# Patient Record
Sex: FEMALE | Race: WHITE | NOT HISPANIC OR LATINO | Employment: FULL TIME | URBAN - METROPOLITAN AREA
[De-identification: names, ages, dates, MRNs, and addresses within clinical notes are randomized per-mention and may not be internally consistent; named-entity substitution may affect disease eponyms.]

---

## 2017-08-10 ENCOUNTER — TRANSFERRED RECORDS (OUTPATIENT)
Dept: HEALTH INFORMATION MANAGEMENT | Facility: CLINIC | Age: 32
End: 2017-08-10

## 2017-08-10 LAB
HPV ABSTRACT: NORMAL
PAP SMEAR - HIM PATIENT REPORTED: NEGATIVE
PAP-ABSTRACT: NORMAL

## 2018-09-26 ENCOUNTER — TRANSFERRED RECORDS (OUTPATIENT)
Dept: HEALTH INFORMATION MANAGEMENT | Facility: CLINIC | Age: 33
End: 2018-09-26

## 2018-10-15 ENCOUNTER — TRANSFERRED RECORDS (OUTPATIENT)
Dept: HEALTH INFORMATION MANAGEMENT | Facility: CLINIC | Age: 33
End: 2018-10-15

## 2019-12-20 ENCOUNTER — TRANSFERRED RECORDS (OUTPATIENT)
Dept: HEALTH INFORMATION MANAGEMENT | Facility: CLINIC | Age: 34
End: 2019-12-20

## 2021-04-02 ENCOUNTER — TRANSFERRED RECORDS (OUTPATIENT)
Dept: HEALTH INFORMATION MANAGEMENT | Facility: CLINIC | Age: 36
End: 2021-04-02
Payer: COMMERCIAL

## 2021-11-23 ENCOUNTER — VIRTUAL VISIT (OUTPATIENT)
Dept: FAMILY MEDICINE | Facility: CLINIC | Age: 36
End: 2021-11-23
Payer: COMMERCIAL

## 2021-11-23 DIAGNOSIS — F90.9 ATTENTION DEFICIT HYPERACTIVITY DISORDER (ADHD), UNSPECIFIED ADHD TYPE: Primary | ICD-10-CM

## 2021-11-23 PROCEDURE — 99203 OFFICE O/P NEW LOW 30 MIN: CPT | Mod: 95 | Performed by: PHYSICIAN ASSISTANT

## 2021-11-23 RX ORDER — LEVONORGESTREL AND ETHINYL ESTRADIOL 6-5-10
1 KIT ORAL DAILY
COMMUNITY
End: 2022-08-03

## 2021-11-23 RX ORDER — DEXTROAMPHETAMINE SACCHARATE, AMPHETAMINE ASPARTATE MONOHYDRATE, DEXTROAMPHETAMINE SULFATE AND AMPHETAMINE SULFATE 2.5; 2.5; 2.5; 2.5 MG/1; MG/1; MG/1; MG/1
10 CAPSULE, EXTENDED RELEASE ORAL DAILY
Status: CANCELLED | OUTPATIENT
Start: 2021-11-23

## 2021-11-23 RX ORDER — DEXTROAMPHETAMINE SACCHARATE, AMPHETAMINE ASPARTATE MONOHYDRATE, DEXTROAMPHETAMINE SULFATE AND AMPHETAMINE SULFATE 3.75; 3.75; 3.75; 3.75 MG/1; MG/1; MG/1; MG/1
15 CAPSULE, EXTENDED RELEASE ORAL DAILY
Qty: 30 CAPSULE | Refills: 0 | Status: SHIPPED | OUTPATIENT
Start: 2021-11-23 | End: 2021-12-16

## 2021-11-23 RX ORDER — ALPRAZOLAM 0.5 MG
0.5 TABLET ORAL DAILY PRN
COMMUNITY
End: 2024-07-03

## 2021-11-23 RX ORDER — CYCLOBENZAPRINE HCL 10 MG
10 TABLET ORAL
COMMUNITY
Start: 2021-07-22 | End: 2021-11-23

## 2021-11-23 RX ORDER — DEXTROAMPHETAMINE SACCHARATE, AMPHETAMINE ASPARTATE MONOHYDRATE, DEXTROAMPHETAMINE SULFATE AND AMPHETAMINE SULFATE 2.5; 2.5; 2.5; 2.5 MG/1; MG/1; MG/1; MG/1
10 CAPSULE, EXTENDED RELEASE ORAL DAILY
COMMUNITY
Start: 2021-04-20 | End: 2021-11-23

## 2021-11-23 ASSESSMENT — ANXIETY QUESTIONNAIRES
7. FEELING AFRAID AS IF SOMETHING AWFUL MIGHT HAPPEN: SEVERAL DAYS
5. BEING SO RESTLESS THAT IT IS HARD TO SIT STILL: SEVERAL DAYS
2. NOT BEING ABLE TO STOP OR CONTROL WORRYING: SEVERAL DAYS
GAD7 TOTAL SCORE: 7
1. FEELING NERVOUS, ANXIOUS, OR ON EDGE: SEVERAL DAYS
IF YOU CHECKED OFF ANY PROBLEMS ON THIS QUESTIONNAIRE, HOW DIFFICULT HAVE THESE PROBLEMS MADE IT FOR YOU TO DO YOUR WORK, TAKE CARE OF THINGS AT HOME, OR GET ALONG WITH OTHER PEOPLE: SOMEWHAT DIFFICULT
6. BECOMING EASILY ANNOYED OR IRRITABLE: SEVERAL DAYS
3. WORRYING TOO MUCH ABOUT DIFFERENT THINGS: SEVERAL DAYS

## 2021-11-23 ASSESSMENT — PATIENT HEALTH QUESTIONNAIRE - PHQ9: 5. POOR APPETITE OR OVEREATING: SEVERAL DAYS

## 2021-11-23 NOTE — PROGRESS NOTES
Christian is a 36 year old who is being evaluated via a billable video visit.      How would you like to obtain your AVS? MyChart  If the video visit is dropped, the invitation should be resent by: Text to cell phone: [phone]  Will anyone else be joining your video visit? No      Video Start Time: 5:25 PM        Subjective   Christian is a 36 year old who presents for the following health issues : new pt getting established    HPI       Chief Complaint   Patient presents with     [facility]     Recheck Medication     Anxiety     Followed by gyn who used to prescribe her adderall but told her needs to have PCP do that now.  She started adderall [id number]  She did testing through the [facility]  She is in school and able to focus better with the adderall  She is followed by gyn for paps    She feels her anxiety currently stable.    She has some scalp painful bumps that she wants checked when she comes in.    She has enough BCP until she comes in for px next month        Objective           Vitals:  No vitals were obtained today due to virtual visit.    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 Has appt to see me for px next month. Will be vaccination record as not sure when last TDAP done. She wants covid booster at that time. Also due for flu shot so recd she get that a her local pharmacy this week  Plan: amphetamine-dextroamphetamine (ADDERALL XR) 15         MG 24 hr capsule        #30, no ref      Prisca Troncoso PA-C            Video-Visit Details    Type of service:  Video Visit    Video End Time:5:46 PM    Originating Location (pt. Location): Home    Distant Location (provider location):  [facility]      Platform used for Video Visit: [facility]

## 2021-11-24 ASSESSMENT — ANXIETY QUESTIONNAIRES: GAD7 TOTAL SCORE: 7

## 2021-12-16 ENCOUNTER — OFFICE VISIT (OUTPATIENT)
Dept: FAMILY MEDICINE | Facility: CLINIC | Age: 36
End: 2021-12-16
Payer: COMMERCIAL

## 2021-12-16 VITALS
HEART RATE: 78 BPM | WEIGHT: 189 LBS | HEIGHT: 66 IN | DIASTOLIC BLOOD PRESSURE: 78 MMHG | OXYGEN SATURATION: 96 % | TEMPERATURE: 97.6 F | RESPIRATION RATE: 18 BRPM | SYSTOLIC BLOOD PRESSURE: 117 MMHG | BODY MASS INDEX: 30.37 KG/M2

## 2021-12-16 DIAGNOSIS — F90.9 ATTENTION DEFICIT HYPERACTIVITY DISORDER (ADHD), UNSPECIFIED ADHD TYPE: Primary | ICD-10-CM

## 2021-12-16 DIAGNOSIS — L21.9 SEBORRHEIC DERMATITIS: ICD-10-CM

## 2021-12-16 DIAGNOSIS — Z30.09 BIRTH CONTROL COUNSELING: ICD-10-CM

## 2021-12-16 DIAGNOSIS — Z23 HIGH PRIORITY FOR 2019 NOVEL CORONAVIRUS VACCINATION: ICD-10-CM

## 2021-12-16 PROCEDURE — 91306 COVID-19,PF,MODERNA (18+ YRS BOOSTER .25ML): CPT | Performed by: PHYSICIAN ASSISTANT

## 2021-12-16 PROCEDURE — 99213 OFFICE O/P EST LOW 20 MIN: CPT | Mod: 25 | Performed by: PHYSICIAN ASSISTANT

## 2021-12-16 PROCEDURE — 0064A COVID-19,PF,MODERNA (18+ YRS BOOSTER .25ML): CPT | Performed by: PHYSICIAN ASSISTANT

## 2021-12-16 RX ORDER — KETOCONAZOLE 20 MG/ML
SHAMPOO TOPICAL DAILY PRN
Qty: 120 ML | Refills: 1 | Status: SHIPPED | OUTPATIENT
Start: 2021-12-16 | End: 2022-08-03

## 2021-12-16 RX ORDER — DEXTROAMPHETAMINE SACCHARATE, AMPHETAMINE ASPARTATE MONOHYDRATE, DEXTROAMPHETAMINE SULFATE AND AMPHETAMINE SULFATE 3.75; 3.75; 3.75; 3.75 MG/1; MG/1; MG/1; MG/1
15 CAPSULE, EXTENDED RELEASE ORAL DAILY
Qty: 30 CAPSULE | Refills: 0 | Status: SHIPPED | OUTPATIENT
Start: 2021-12-16 | End: 2022-08-03

## 2021-12-16 ASSESSMENT — MIFFLIN-ST. JEOR: SCORE: 1568.02

## 2021-12-16 NOTE — PROGRESS NOTES
"HPI: Christian is a 35 yo female here to get established  She occas gets bumps on her scalp when she is stress  She occas has itching behind her ears    ADHD: takes adderall for years.    Pt thinking of going off of birth control in the spring to try to get pregnant  Her pap is up to date.   Will request gyn records.  She'd like ref to new gyn in [place]    She has xanax on the list for fear of flying so doesn't take often    Past Medical History:   Diagnosis Date     Anxiety      No past surgical history on file.  Social History     Tobacco Use     Smoking status: Never Smoker     Smokeless tobacco: Never Used   Substance Use Topics     Alcohol use: Yes     Comment: 0-4 per week     Current Outpatient Medications   Medication Sig Dispense Refill     ALPRAZolam (XANAX) 0.5 MG tablet Take 0.5 mg by mouth daily as needed for anxiety       amphetamine-dextroamphetamine (ADDERALL XR) 15 MG 24 hr capsule Take 1 capsule (15 mg) by mouth daily 30 capsule 0     ketoconazole (NIZORAL) 2 % external shampoo Apply topically daily as needed for itching or irritation 120 mL 1     levonorg-eth estrad triphasic (TRIVORA, 28,) 50-30/75-40/ 125-30 MCG TABS Take 1 tablet by mouth daily       Allergies   Allergen Reactions     Seasonal Allergies        PHYSICAL EXAM:    /78 (BP Location: Right arm, Patient Position: Chair, Cuff Size: Adult Large)   Pulse 78   Temp 97.6  F (36.4  C) (Temporal)   Resp 18   Ht 1.683 m (5' 6.25\")   Wt 85.7 kg (189 lb)   LMP 12/14/2021   SpO2 96%   BMI 30.28 kg/m      Patient appears non toxic  Psych: approp affect and mood  Scalp: no flaking or redness. Few excoriated bumps    Assessment and Plan:     (F90.9) Attention deficit hyperactivity disorder (ADHD), unspecified ADHD type  (primary encounter diagnosis)  Comment:   Plan: amphetamine-dextroamphetamine (ADDERALL XR) 15         MG 24 hr capsule        refilled    (Z30.09) Birth control counseling  Comment: [facility]  Plan: " Ob/Gyn Referral        She will attempt pregnancy this spring. Will request previous gyn records for the chart. She believes her pap is up to date. Will check those records for last Tdap.      (L21.9) Seborrheic dermatitis  Comment:   Plan: ketoconazole (NIZORAL) 2 % external shampoo          *Covid booster given today. Recd flu shot next week    Prisca Troncoso PA-C

## 2022-01-18 ENCOUNTER — MYC REFILL (OUTPATIENT)
Dept: FAMILY MEDICINE | Facility: CLINIC | Age: 37
End: 2022-01-18
Payer: COMMERCIAL

## 2022-01-18 DIAGNOSIS — F90.9 ATTENTION DEFICIT HYPERACTIVITY DISORDER (ADHD), UNSPECIFIED ADHD TYPE: ICD-10-CM

## 2022-01-18 RX ORDER — DEXTROAMPHETAMINE SACCHARATE, AMPHETAMINE ASPARTATE MONOHYDRATE, DEXTROAMPHETAMINE SULFATE AND AMPHETAMINE SULFATE 3.75; 3.75; 3.75; 3.75 MG/1; MG/1; MG/1; MG/1
15 CAPSULE, EXTENDED RELEASE ORAL DAILY
Qty: 30 CAPSULE | Refills: 0 | Status: CANCELLED | OUTPATIENT
Start: 2022-01-18

## 2022-01-19 RX ORDER — DEXTROAMPHETAMINE SACCHARATE, AMPHETAMINE ASPARTATE MONOHYDRATE, DEXTROAMPHETAMINE SULFATE AND AMPHETAMINE SULFATE 3.75; 3.75; 3.75; 3.75 MG/1; MG/1; MG/1; MG/1
15 CAPSULE, EXTENDED RELEASE ORAL DAILY
Qty: 30 CAPSULE | Refills: 0 | Status: SHIPPED | OUTPATIENT
Start: 2022-01-19 | End: 2022-03-23

## 2022-01-19 NOTE — TELEPHONE ENCOUNTER
Routing refill request to provider for review/approval because:  Drug not on the [facility] refill protocol .     April Torrez RN  [facility] Triage

## 2022-02-06 ENCOUNTER — HEALTH MAINTENANCE LETTER (OUTPATIENT)
Age: 37
End: 2022-02-06

## 2022-03-23 ENCOUNTER — MYC REFILL (OUTPATIENT)
Dept: FAMILY MEDICINE | Facility: CLINIC | Age: 37
End: 2022-03-23
Payer: COMMERCIAL

## 2022-03-23 DIAGNOSIS — F90.9 ATTENTION DEFICIT HYPERACTIVITY DISORDER (ADHD), UNSPECIFIED ADHD TYPE: ICD-10-CM

## 2022-03-28 RX ORDER — DEXTROAMPHETAMINE SACCHARATE, AMPHETAMINE ASPARTATE MONOHYDRATE, DEXTROAMPHETAMINE SULFATE AND AMPHETAMINE SULFATE 3.75; 3.75; 3.75; 3.75 MG/1; MG/1; MG/1; MG/1
15 CAPSULE, EXTENDED RELEASE ORAL DAILY
Qty: 30 CAPSULE | Refills: 0 | Status: SHIPPED | OUTPATIENT
Start: 2022-03-28 | End: 2022-04-29

## 2022-03-28 NOTE — TELEPHONE ENCOUNTER
Routing refill request to provider for review/approval because:  Drug not on the [facility] refill protocol   Salena Johns RN

## 2022-04-29 ENCOUNTER — MYC REFILL (OUTPATIENT)
Dept: FAMILY MEDICINE | Facility: CLINIC | Age: 37
End: 2022-04-29
Payer: COMMERCIAL

## 2022-04-29 DIAGNOSIS — F90.9 ATTENTION DEFICIT HYPERACTIVITY DISORDER (ADHD), UNSPECIFIED ADHD TYPE: ICD-10-CM

## 2022-05-02 RX ORDER — DEXTROAMPHETAMINE SACCHARATE, AMPHETAMINE ASPARTATE MONOHYDRATE, DEXTROAMPHETAMINE SULFATE AND AMPHETAMINE SULFATE 3.75; 3.75; 3.75; 3.75 MG/1; MG/1; MG/1; MG/1
15 CAPSULE, EXTENDED RELEASE ORAL DAILY
Qty: 30 CAPSULE | Refills: 0 | Status: SHIPPED | OUTPATIENT
Start: 2022-05-02 | End: 2022-06-20

## 2022-05-02 NOTE — TELEPHONE ENCOUNTER
Last visit 12/16/21    Routing refill request to provider for review/approval because:  Drug not on the [facility] refill protocol     Norberto Covarrubias RN  [facility]       amphetamine-dextroamphetamine (ADDERALL XR) 15 MG 24 hr capsule 30 capsule 0 3/28/2022  No   Sig - Route: Take 1 capsule (15 mg) by mouth daily - Oral   Sent to pharmacy as: Amphetamine-Dextroamphet ER 15 MG Oral Capsule Extended Release 24 Hour (ADDERALL XR)   Class: E-Prescribe   Earliest Fill Date: 3/28/2022   Order: [id number]   E-Prescribing Status: Receipt confirmed by pharmacy (3/28/2022 12:58 PM CDT)

## 2022-06-20 ENCOUNTER — MYC REFILL (OUTPATIENT)
Dept: FAMILY MEDICINE | Facility: CLINIC | Age: 37
End: 2022-06-20
Payer: COMMERCIAL

## 2022-06-20 DIAGNOSIS — F90.9 ATTENTION DEFICIT HYPERACTIVITY DISORDER (ADHD), UNSPECIFIED ADHD TYPE: ICD-10-CM

## 2022-06-21 RX ORDER — DEXTROAMPHETAMINE SACCHARATE, AMPHETAMINE ASPARTATE MONOHYDRATE, DEXTROAMPHETAMINE SULFATE AND AMPHETAMINE SULFATE 3.75; 3.75; 3.75; 3.75 MG/1; MG/1; MG/1; MG/1
15 CAPSULE, EXTENDED RELEASE ORAL DAILY
Qty: 30 CAPSULE | Refills: 0 | Status: SHIPPED | OUTPATIENT
Start: 2022-06-21 | End: 2024-08-02

## 2022-07-24 ENCOUNTER — HEALTH MAINTENANCE LETTER (OUTPATIENT)
Age: 37
End: 2022-07-24

## 2022-08-03 ENCOUNTER — PRENATAL OFFICE VISIT (OUTPATIENT)
Dept: NURSING | Facility: CLINIC | Age: 37
End: 2022-08-03

## 2022-08-03 ENCOUNTER — TRANSCRIBE ORDERS (OUTPATIENT)
Dept: MATERNAL FETAL MEDICINE | Facility: CLINIC | Age: 37
End: 2022-08-03

## 2022-08-03 VITALS — WEIGHT: 170 LBS | BODY MASS INDEX: 26.68 KG/M2 | HEIGHT: 67 IN

## 2022-08-03 DIAGNOSIS — O09.519 ENCOUNTER FOR SUPERVISION OF PRIMIGRAVIDA OF ADVANCED MATERNAL AGE: Primary | ICD-10-CM

## 2022-08-03 DIAGNOSIS — O26.90 PREGNANCY RELATED CONDITION, ANTEPARTUM: Primary | ICD-10-CM

## 2022-08-03 DIAGNOSIS — Z23 NEED FOR TDAP VACCINATION: ICD-10-CM

## 2022-08-03 PROCEDURE — 99207 PR NO CHARGE NURSE ONLY: CPT

## 2022-08-03 RX ORDER — LEVONORGESTREL AND ETHINYL ESTRADIOL 6-5-10
KIT ORAL
COMMUNITY
Start: 2021-07-04 | End: 2022-08-03

## 2022-08-03 NOTE — PROGRESS NOTES
Important Information for Provider:     New ob nurse intake by phone, first pregnancy,AMA. Handouts reviewed and mailed. Recommended B6, Unisom for nausea. Ordered genetic screening. Ultrasound scheduled for 8/17/2022 with Dr Farris to call with results. NOB with Dr Pop 8/25/2022. Patient states her periods occurred every 40 days.   Patient stopped taking her Adderall 1 week ago    Caffeine intake/servings daily - 0  Calcium intake/servings daily - 3  Exercise 5 times weekly - describe ; Jason, , precautions given  Sunscreen used - Yes  Seatbelts used - Yes  Guns stored in the home - No  Self Breast Exam - Yes  Pap test up to date -  Yes  Eye exam up to date -  Yes  Dental exam up to date -  Yes  Immunizations reviewed and up to date - Yes  Abuse: Current or Past (Physical, Sexual or Emotional) - No  Do you feel safe in your environment - Yes  Do you cope well with stress - Yes  Do you suffer from insomnia - No        Prenatal OB Questionnaire  Patient supplied answers from flow sheet for:  Prenatal OB Questionnaire.  Past Medical History  Have you ever recieved care for your mental health? : (!) Yes  Have you ever been in a major accident or suffered serious trauma?: No  Within the last year, has anyone hit, slapped, kicked or otherwise hurt you?: No  In the last year, has anyone forced you to have sex when you didn't want to?: No    Past Medical History 2   Have you ever received a blood transfusion?: No  Would you accept a blood transfusion if was medically recommended?: Yes  Does anyone in your home smoke?: No   Is your blood type Rh negative?: Unknown  Have you ever ?: No  Have you been hospitalized for a nonsurgical reason excluding normal delivery?: No  Have you ever had an abnormal pap smear?: No    Past Medical History (Continued)  Do you have a history of abnormalities of the uterus?: No  Did your mother take ALVA or any other hormones when she was pregnant with you?: No  Do  you have any other problems we have not asked about which you feel may be important to this pregnancy?: No                     Allergies as of 8/3/2022:    Allergies as of 08/03/2022 - Reviewed 08/03/2022   Allergen Reaction Noted     Seasonal allergies  11/23/2021       Current medications are:  Current Outpatient Medications   Medication Sig Dispense Refill     ALPRAZolam (XANAX) 0.5 MG tablet Take 0.5 mg by mouth daily as needed for anxiety (Patient not taking: Reported on 8/3/2022)       amphetamine-dextroamphetamine (ADDERALL XR) 15 MG 24 hr capsule Take 1 capsule (15 mg) by mouth daily (Patient not taking: No sig reported) 30 capsule 0         Early ultrasound screening tool:    Does patient have irregular periods?  No  Did patient use hormonal birth control in the three months prior to positive urine pregnancy test? No  Is the patient breastfeeding?  No  Is the patient 10 weeks or greater at time of education visit?  No

## 2022-08-16 ENCOUNTER — MYC MEDICAL ADVICE (OUTPATIENT)
Dept: OBGYN | Facility: CLINIC | Age: 37
End: 2022-08-16

## 2022-08-17 ENCOUNTER — ANCILLARY PROCEDURE (OUTPATIENT)
Dept: ULTRASOUND IMAGING | Facility: CLINIC | Age: 37
End: 2022-08-17
Attending: OBSTETRICS & GYNECOLOGY
Payer: COMMERCIAL

## 2022-08-17 DIAGNOSIS — O09.519 ENCOUNTER FOR SUPERVISION OF PRIMIGRAVIDA OF ADVANCED MATERNAL AGE: ICD-10-CM

## 2022-08-17 PROCEDURE — 76801 OB US < 14 WKS SINGLE FETUS: CPT | Performed by: OBSTETRICS & GYNECOLOGY

## 2022-08-22 ENCOUNTER — PRE VISIT (OUTPATIENT)
Dept: MATERNAL FETAL MEDICINE | Facility: CLINIC | Age: 37
End: 2022-08-22

## 2022-08-24 LAB
ABO/RH(D): ABNORMAL
ANTIBODY SCREEN: POSITIVE
SPECIMEN EXPIRATION DATE: ABNORMAL

## 2022-08-25 ENCOUNTER — PRENATAL OFFICE VISIT (OUTPATIENT)
Dept: OBGYN | Facility: CLINIC | Age: 37
End: 2022-08-25
Payer: COMMERCIAL

## 2022-08-25 VITALS
HEIGHT: 67 IN | OXYGEN SATURATION: 98 % | WEIGHT: 187.9 LBS | DIASTOLIC BLOOD PRESSURE: 70 MMHG | BODY MASS INDEX: 29.49 KG/M2 | HEART RATE: 84 BPM | SYSTOLIC BLOOD PRESSURE: 120 MMHG

## 2022-08-25 DIAGNOSIS — Z12.4 CERVICAL CANCER SCREENING: ICD-10-CM

## 2022-08-25 DIAGNOSIS — O09.511 ENCOUNTER FOR SUPERVISION OF PRIMIGRAVIDA OF ADVANCED MATERNAL AGE IN FIRST TRIMESTER: Primary | ICD-10-CM

## 2022-08-25 DIAGNOSIS — F41.9 ANXIETY: ICD-10-CM

## 2022-08-25 DIAGNOSIS — Z11.3 SCREEN FOR STD (SEXUALLY TRANSMITTED DISEASE): ICD-10-CM

## 2022-08-25 LAB
ALBUMIN UR-MCNC: NEGATIVE MG/DL
ANTIBODY ID: NORMAL
APPEARANCE UR: CLEAR
BILIRUB UR QL STRIP: NEGATIVE
COLOR UR AUTO: YELLOW
ERYTHROCYTE [DISTWIDTH] IN BLOOD BY AUTOMATED COUNT: 12.5 % (ref 10–15)
GLUCOSE UR STRIP-MCNC: NEGATIVE MG/DL
HBV SURFACE AG SERPL QL IA: NONREACTIVE
HCT VFR BLD AUTO: 38.6 % (ref 35–47)
HCV AB SERPL QL IA: NONREACTIVE
HGB BLD-MCNC: 13 G/DL (ref 11.7–15.7)
HGB UR QL STRIP: NEGATIVE
HIV 1+2 AB+HIV1 P24 AG SERPL QL IA: NONREACTIVE
KETONES UR STRIP-MCNC: NEGATIVE MG/DL
LEUKOCYTE ESTERASE UR QL STRIP: NEGATIVE
MCH RBC QN AUTO: 32.8 PG (ref 26.5–33)
MCHC RBC AUTO-ENTMCNC: 33.7 G/DL (ref 31.5–36.5)
MCV RBC AUTO: 98 FL (ref 78–100)
NITRATE UR QL: NEGATIVE
PH UR STRIP: 6.5 [PH] (ref 5–7)
PLATELET # BLD AUTO: 185 10E3/UL (ref 150–450)
RBC # BLD AUTO: 3.96 10E6/UL (ref 3.8–5.2)
RUBV IGG SERPL QL IA: 0.86 INDEX
RUBV IGG SERPL QL IA: NORMAL
SP GR UR STRIP: 1.02 (ref 1–1.03)
SPECIMEN EXPIRATION DATE: NORMAL
T PALLIDUM AB SER QL: NONREACTIVE
UROBILINOGEN UR STRIP-ACNC: 1 E.U./DL
WBC # BLD AUTO: 7.9 10E3/UL (ref 4–11)

## 2022-08-25 PROCEDURE — 86870 RBC ANTIBODY IDENTIFICATION: CPT | Performed by: OBSTETRICS & GYNECOLOGY

## 2022-08-25 PROCEDURE — 87491 CHLMYD TRACH DNA AMP PROBE: CPT | Performed by: OBSTETRICS & GYNECOLOGY

## 2022-08-25 PROCEDURE — 86900 BLOOD TYPING SEROLOGIC ABO: CPT | Performed by: OBSTETRICS & GYNECOLOGY

## 2022-08-25 PROCEDURE — 85027 COMPLETE CBC AUTOMATED: CPT | Performed by: OBSTETRICS & GYNECOLOGY

## 2022-08-25 PROCEDURE — 36415 COLL VENOUS BLD VENIPUNCTURE: CPT | Performed by: OBSTETRICS & GYNECOLOGY

## 2022-08-25 PROCEDURE — 87624 HPV HI-RISK TYP POOLED RSLT: CPT | Performed by: OBSTETRICS & GYNECOLOGY

## 2022-08-25 PROCEDURE — 87086 URINE CULTURE/COLONY COUNT: CPT | Performed by: OBSTETRICS & GYNECOLOGY

## 2022-08-25 PROCEDURE — 87389 HIV-1 AG W/HIV-1&-2 AB AG IA: CPT | Performed by: OBSTETRICS & GYNECOLOGY

## 2022-08-25 PROCEDURE — 86850 RBC ANTIBODY SCREEN: CPT | Performed by: OBSTETRICS & GYNECOLOGY

## 2022-08-25 PROCEDURE — 86762 RUBELLA ANTIBODY: CPT | Performed by: OBSTETRICS & GYNECOLOGY

## 2022-08-25 PROCEDURE — G0145 SCR C/V CYTO,THINLAYER,RESCR: HCPCS | Performed by: OBSTETRICS & GYNECOLOGY

## 2022-08-25 PROCEDURE — 86901 BLOOD TYPING SEROLOGIC RH(D): CPT | Performed by: OBSTETRICS & GYNECOLOGY

## 2022-08-25 PROCEDURE — 86803 HEPATITIS C AB TEST: CPT | Performed by: OBSTETRICS & GYNECOLOGY

## 2022-08-25 PROCEDURE — 81003 URINALYSIS AUTO W/O SCOPE: CPT | Performed by: OBSTETRICS & GYNECOLOGY

## 2022-08-25 PROCEDURE — 87340 HEPATITIS B SURFACE AG IA: CPT | Performed by: OBSTETRICS & GYNECOLOGY

## 2022-08-25 PROCEDURE — 86780 TREPONEMA PALLIDUM: CPT | Performed by: OBSTETRICS & GYNECOLOGY

## 2022-08-25 PROCEDURE — 99207 PR FIRST OB VISIT: CPT | Performed by: OBSTETRICS & GYNECOLOGY

## 2022-08-25 PROCEDURE — 87591 N.GONORRHOEAE DNA AMP PROB: CPT | Performed by: OBSTETRICS & GYNECOLOGY

## 2022-08-25 ASSESSMENT — PATIENT HEALTH QUESTIONNAIRE - PHQ9
SUM OF ALL RESPONSES TO PHQ QUESTIONS 1-9: 4
5. POOR APPETITE OR OVEREATING: SEVERAL DAYS

## 2022-08-25 ASSESSMENT — ANXIETY QUESTIONNAIRES
1. FEELING NERVOUS, ANXIOUS, OR ON EDGE: SEVERAL DAYS
2. NOT BEING ABLE TO STOP OR CONTROL WORRYING: NOT AT ALL
5. BEING SO RESTLESS THAT IT IS HARD TO SIT STILL: NOT AT ALL
GAD7 TOTAL SCORE: 5
IF YOU CHECKED OFF ANY PROBLEMS ON THIS QUESTIONNAIRE, HOW DIFFICULT HAVE THESE PROBLEMS MADE IT FOR YOU TO DO YOUR WORK, TAKE CARE OF THINGS AT HOME, OR GET ALONG WITH OTHER PEOPLE: SOMEWHAT DIFFICULT
7. FEELING AFRAID AS IF SOMETHING AWFUL MIGHT HAPPEN: NOT AT ALL
3. WORRYING TOO MUCH ABOUT DIFFERENT THINGS: SEVERAL DAYS
6. BECOMING EASILY ANNOYED OR IRRITABLE: MORE THAN HALF THE DAYS
GAD7 TOTAL SCORE: 5

## 2022-08-25 NOTE — PROGRESS NOTES
"[facility]- OBGYN    Estimated Date of Delivery: Mar 22, 2023  SUBJECTIVE:     HPI:    Christian Nuñez is a 37 year old  at 10w1d by 9w0d us who presents today for her first OB visit.  Patient reports that she has no cramping now.  She denies any vaginal bleeding, abnormal vaginal discharge, dysuria, fever, chills, headache, chest pain, chest pressure, shortness of breath, vomiting, constipation, or edema.  She does have some nausea, but does not feel it is severe enough for medications.  She states her anxiety/ depression symptoms have been worse since becoming pregnant.  She is feeling irritable.  She states she has tried Lexapro in the past that didn't work well for her.  She is not currently in therapy or on medications.  She is moving to a new house, which is also stressful to her.  Her family she has found is very observant and sometimes critical of her body habitus, which also adds to her stress.      OBGYN Hx:   OB History    Para Term  AB Living   1 0 0 0 0 0   SAB IAB Ectopic Multiple Live Births   0 0 0 0 0      # Outcome Date GA Lbr Radu/2nd Weight Sex Delivery Anes PTL Lv   1 Current              Patient's last menstrual period was 2022.  Menses: irregular.  She stopped her OCPs in November   STD Hx:none  Pap hx: 8/10/17 NILM HPV negative     PMH:  Anxiety, ADHD  PSH: wisdom tooth removal   Meds: PNV  Allergies: seasonal  SH: Denies any tobacco, alcohol, or drug use   FH: Maternal second cousin had leukemia     OBJECTIVE:     O:   Patient Vitals for the past 24 hrs:   BP Pulse SpO2 Height Weight   22 0854 120/70 84 98 % 1.702 m (5' 7\") 85.2 kg (187 lb 14.4 oz)   ]  Exam:  General- awake, alert, answering questions appropriately, appears comfortable  CV- regular rate  Lung- breathing comfortably on room air  Abd- soft, non-tender, non-distended  - normal appearing external genitalia, normal appearing vaginal mucosa, normal appearing cervix, moderate amount " of thin white vaginal discharge     ASSESSMENT/PLAN:     Christian Nuñez is a 37 year old  at 10w1d by 9w0d us who presents today for her first OB visit.     (O09.511) Encounter for supervision of primigravida of advanced maternal age in first trimester  (primary encounter diagnosis)  Comment: Patient is overall doing well.  Reviewed first OB labs to be collected today.  Discussed [facility] for delivery location, call schedule, clinic visit schedule, team structure, and involvement of residents and medical students in care.  Reviewed normal weight gain in pregnancy of 15-25 lbs.  Advised patient against practicing hot yoga during pregnancy.  Plan for genetic testing in pregnancy.    Plan: MFM apt 22  Next visit in 4-6 weeks    (Z11.3) Screen for STD (sexually transmitted disease)  Comment: routine first ob labs  Plan: NEISSERIA GONORRHOEA PCR, CHLAMYDIA TRACHOMATIS        PCR    (Z12.4) Cervical cancer screening  Comment: due for pap  Plan: Pap screen with HPV - recommended age 30 - 65         years    (F41.9) Anxiety  Comment: Patient with worsening anxiety and irritability since becoming pregnant.  Discussed establishing care with Bridget Tucker and evaluating for starting medications in future if needed.  Patient is open to this.  Plan: visit with behavioral health scheduled for 22      Niki Pop MD

## 2022-08-26 LAB
C TRACH DNA SPEC QL NAA+PROBE: NEGATIVE
N GONORRHOEA DNA SPEC QL NAA+PROBE: NEGATIVE

## 2022-08-27 LAB — BACTERIA UR CULT: NORMAL

## 2022-08-29 LAB
BKR LAB AP GYN ADEQUACY: NORMAL
BKR LAB AP GYN INTERPRETATION: NORMAL
BKR LAB AP HPV REFLEX: NORMAL
BKR LAB AP PREVIOUS ABNORMAL: NORMAL
PATH REPORT.COMMENTS IMP SPEC: NORMAL
PATH REPORT.COMMENTS IMP SPEC: NORMAL
PATH REPORT.RELEVANT HX SPEC: NORMAL

## 2022-09-01 LAB
HUMAN PAPILLOMA VIRUS 16 DNA: NEGATIVE
HUMAN PAPILLOMA VIRUS 18 DNA: NEGATIVE
HUMAN PAPILLOMA VIRUS FINAL DIAGNOSIS: NORMAL
HUMAN PAPILLOMA VIRUS OTHER HR: NEGATIVE

## 2022-09-12 ENCOUNTER — PRE VISIT (OUTPATIENT)
Dept: MATERNAL FETAL MEDICINE | Facility: CLINIC | Age: 37
End: 2022-09-12

## 2022-09-16 ENCOUNTER — HOSPITAL ENCOUNTER (OUTPATIENT)
Dept: ULTRASOUND IMAGING | Facility: CLINIC | Age: 37
Discharge: HOME | End: 2022-09-16
Attending: OBSTETRICS & GYNECOLOGY
Payer: COMMERCIAL

## 2022-09-16 ENCOUNTER — OFFICE VISIT (OUTPATIENT)
Dept: MATERNAL FETAL MEDICINE | Facility: CLINIC | Age: 37
End: 2022-09-16
Attending: OBSTETRICS & GYNECOLOGY
Payer: COMMERCIAL

## 2022-09-16 ENCOUNTER — LAB (OUTPATIENT)
Dept: LAB | Facility: CLINIC | Age: 37
End: 2022-09-16
Attending: OBSTETRICS & GYNECOLOGY
Payer: COMMERCIAL

## 2022-09-16 DIAGNOSIS — O26.90 PREGNANCY RELATED CONDITION, ANTEPARTUM: ICD-10-CM

## 2022-09-16 DIAGNOSIS — O09.519 ADVANCED MATERNAL AGE, PRIMIGRAVIDA, ANTEPARTUM: Primary | ICD-10-CM

## 2022-09-16 DIAGNOSIS — O09.521 MULTIGRAVIDA OF ADVANCED MATERNAL AGE IN FIRST TRIMESTER: ICD-10-CM

## 2022-09-16 DIAGNOSIS — O09.521 MULTIGRAVIDA OF ADVANCED MATERNAL AGE IN FIRST TRIMESTER: Primary | ICD-10-CM

## 2022-09-16 PROCEDURE — 76813 OB US NUCHAL MEAS 1 GEST: CPT | Mod: 26 | Performed by: OBSTETRICS & GYNECOLOGY

## 2022-09-16 PROCEDURE — 36415 COLL VENOUS BLD VENIPUNCTURE: CPT

## 2022-09-16 PROCEDURE — 96040 HC GENETIC COUNSELING, EACH 30 MINUTES: CPT | Performed by: GENETIC COUNSELOR, MS

## 2022-09-16 PROCEDURE — 76813 OB US NUCHAL MEAS 1 GEST: CPT

## 2022-09-16 NOTE — PROGRESS NOTES
[facility]  Genetic Counseling Consult    Patient: Christian Nuñez YOB: 1985   Date of Service: 22      Christian Nuñez was seen at [facility] for genetic consultation to discuss the options for screening and testing for fetal chromosome abnormalities.  The indication for genetic counseling is advanced maternal age. The patient was accompanied by her partner, Bandar to today's visit.        Impression/Plan:   1.  Christian elected to proceed with NT ultrasound and NIPT through [facility] and opted to screen for sex chromosome aneuploidies including fetal sex. Results are expected in 10-14 days. Christian requested that we do not leave results in her voicemail. Patient was informed that results will also be available via Transfer To.    2.  Maternal serum AFP (single marker screen) is recommended after 15 weeks to screen for open neural tube defects. A quad screen should not be performed.    3.  An 18-20 week comprehensive ultrasound is standard of care for all women 35 or older at delivery.    Pregnancy History:   /Parity:    Age at Delivery: 37 year old  AURA: 3/22/2023, by Ultrasound  Gestational Age: 13w2d    No significant complications or exposures were reported in the current pregnancy.    Medical History:   Christian s reported medical history is not expected to impact pregnancy management or risks to fetal development.       Family History:   A three-generation pedigree was obtained, and is scanned under the  Media  tab.   The following significant findings were reported by Christian:    Christian reported that she has one maternal first cousin who was found to have Down syndrome. She also reported that she had another maternal first cousin who passed away as an infant who she believes had Down syndrome (through a different aunt/uncle). We reviewed that over 95% of cases of Down syndrome result from trisomy 21 caused by  nondisjunction.  Rarely, Down syndrome occurs due to a translocation involving chromosome 21, which, if carried by other family members, puts them at increased risk to have a baby with Down syndrome. A familial translocation cannot be excluded without further details of the affected individual. Christian has elected to pursue NIPT in this pregnancy.     Otherwise, the reported family history is negative for multiple miscarriages, stillbirths, birth defects, intellectual disability, known genetic conditions, and consanguinity.       Carrier Screening:   The patient reports that she and the father of the pregnancy have  ancestry:     Cystic fibrosis is an autosomal recessive genetic condition that occurs with increased frequency in individuals of  ancestry and carrier screening for this condition is available.  In addition,  screening in the [facility] includes cystic fibrosis.      Expanded carrier screening for mutations in a large panel of genes associated with autosomal recessive conditions including cystic fibrosis, spinal muscular atrophy, and others, is now available.      The patient has declined the carrier screening options reviewed today.       Risk Assessment for Chromosome Conditions:   We explained that the risk for fetal chromosome abnormalities increases with maternal age. We discussed specific features of common chromosome abnormalities, including Down syndrome, trisomy 13, trisomy 18, and sex chromosome trisomies.      - At age 37 at midtrimester, the risk to have a baby with Down syndrome is 1 in 168.     - At age 37 at midtrimester, the risk to have a baby with any chromosome abnormality is 1 in 82.        Testing Options:   We discussed the following options:   Non-invasive Prenatal Testing (NIPT)    Maternal plasma cell-free DNA testing; first trimester ultrasound with nuchal translucency and nasal bone assessment is recommended, when appropriate    Screens for fetal  trisomy 21, trisomy 13, trisomy 18, and sex chromosome aneuploidy    Cannot screen for open neural tube defects; maternal serum AFP after 15 weeks is recommended     Chorionic villus sampling (CVS)    Invasive procedure typically performed in the first trimester by which placental villi are obtained for the purpose of chromosome analysis and/or other prenatal genetic analysis    Diagnostic results; >99% sensitivity for fetal chromosome abnormalities    Cannot test for open neural tube defects; maternal serum AFP after 15 weeks is recommended     Genetic Amniocentesis    Invasive procedure typically performed in the second trimester by which amniotic fluid is obtained for the purpose of chromosome analysis and/or other prenatal genetic analysis    Diagnostic results; >99% sensitivity for fetal chromosome abnormalities    AFAFP measurement tests for open neural tube defects     Comprehensive (Level II) ultrasound: Detailed ultrasound performed between 18-22 weeks gestation to screen for major birth defects and markers for aneuploidy.      We reviewed the benefits and limitations of this testing.  Screening tests provide a risk assessment specific to the pregnancy for certain fetal chromosome abnormalities, but cannot definitively diagnose or exclude a fetal chromosome abnormality.  Follow-up genetic counseling and consideration of diagnostic testing is recommended with any abnormal screening result.     Diagnostic tests carry inherent risks- including risk of miscarriage- that require careful consideration.  These tests can detect fetal chromosome abnormalities with greater than 99% certainty.  Results can be compromised by maternal cell contamination or mosaicism, and are limited by the resolution of cytogenetic G-banding technology.  There is no screening nor diagnostic test that can detect all forms of birth defects or mental disability.     It was a pleasure to be involved with [facility] care. Face-to-face time of  the meeting was 25 minutes.    Citlali Torres MS, [facility]  Licensed Genetic Counselor  [facility]  Maternal Fetal Medicine  Ph: [phone]  [email or web address]

## 2022-09-19 NOTE — PROGRESS NOTES
"Please see \"Imaging\" tab under \"Chart Review\" for details of today's US.    Bceca Lopez, DO    "

## 2022-09-26 ENCOUNTER — TELEPHONE (OUTPATIENT)
Dept: OBGYN | Facility: CLINIC | Age: 37
End: 2022-09-26

## 2022-09-26 ENCOUNTER — TELEPHONE (OUTPATIENT)
Dept: MATERNAL FETAL MEDICINE | Facility: CLINIC | Age: 37
End: 2022-09-26

## 2022-09-26 LAB — SCANNED LAB RESULT: NORMAL

## 2022-09-26 NOTE — TELEPHONE ENCOUNTER
Christian is  @14w5d    Patient spoke with nurse from [facility] - noticed bleeding on toilet paper after having a bowel movement.   Red, scant.   No pain.   No recent intercourse.   Uncertain if bleeding from vagina or rectum. Bleeding isolated to when she was wiping.     Recommended rest, hydration, monitor.   Bleeding precautions provided.     Next office visit .

## 2022-09-26 NOTE — TELEPHONE ENCOUNTER
September 26, 2022  I spoke with Christian regarding her NIPT results.     Results indicate NO ANEUPLOIDY DETECTED for chromosomes 21, 18, 13, or sex chromosomes. Sex of baby in envelope at [facility]  for Christian's partner, Bandar to . She provided verbal permission.      This puts her current pregnancy at low risk for Down syndrome, trisomy 18, trisomy 13 and sex chromosome abnormalities. This test is reported to have the following sensitivities: Down syndrome: 99.99%, trisomy 18: 99.99%, and trisomy 13: 99.99%. Although these results are reassuring, this does not replace a standard chromosome analysis from a chorionic villus sampling or amniocentesis.     Level II ultrasound is recommended, given AMA.     MSAFP is the appropriate second trimester screening test for open neural tube defects; the maternal quad screen is not recommended.    Her results are available in her Epic chart for her primary OB to review.     Citlali Torres MS, [facility]  Licensed Genetic Counselor  [facility]  Maternal Fetal Medicine  Ph: [phone]  [email or web address]

## 2022-09-28 ENCOUNTER — PRENATAL OFFICE VISIT (OUTPATIENT)
Dept: OBGYN | Facility: CLINIC | Age: 37
End: 2022-09-28
Payer: COMMERCIAL

## 2022-09-28 VITALS
WEIGHT: 188.6 LBS | OXYGEN SATURATION: 99 % | HEART RATE: 83 BPM | BODY MASS INDEX: 29.54 KG/M2 | SYSTOLIC BLOOD PRESSURE: 129 MMHG | DIASTOLIC BLOOD PRESSURE: 79 MMHG

## 2022-09-28 DIAGNOSIS — Z34.02 ENCOUNTER FOR SUPERVISION OF NORMAL FIRST PREGNANCY IN SECOND TRIMESTER: Primary | ICD-10-CM

## 2022-09-28 PROCEDURE — 82105 ALPHA-FETOPROTEIN SERUM: CPT | Mod: 90 | Performed by: OBSTETRICS & GYNECOLOGY

## 2022-09-28 PROCEDURE — 99000 SPECIMEN HANDLING OFFICE-LAB: CPT | Performed by: OBSTETRICS & GYNECOLOGY

## 2022-09-28 PROCEDURE — 99207 PR PRENATAL VISIT: CPT | Performed by: OBSTETRICS & GYNECOLOGY

## 2022-09-28 PROCEDURE — 36415 COLL VENOUS BLD VENIPUNCTURE: CPT | Performed by: OBSTETRICS & GYNECOLOGY

## 2022-09-28 NOTE — PROGRESS NOTES
15w0d overall feeling ok, still tired.  Noted some blood after BM when she wiped, nothing since.  Reassured.  Normal NIPT, AFP today.  Declines flu and covid today.  Will coordinate next visit with level 2.  RTC 4 weeks  berhane

## 2022-10-01 LAB
# FETUSES US: NORMAL
AFP MOM SERPL: 1.48
AFP SERPL-MCNC: 37 NG/ML
AGE - REPORTED: 37.6 YR
CURRENT SMOKER: NORMAL
FAMILY MEMBER DISEASES HX: NO
GA METHOD: NORMAL
GA: NORMAL WK
IDDM PATIENT QL: NO
INTEGRATED SCN PATIENT-IMP: NORMAL
SPECIMEN DRAWN SERPL: NORMAL

## 2022-10-03 ENCOUNTER — HEALTH MAINTENANCE LETTER (OUTPATIENT)
Age: 37
End: 2022-10-03

## 2022-10-21 ENCOUNTER — OFFICE VISIT (OUTPATIENT)
Dept: MATERNAL FETAL MEDICINE | Facility: CLINIC | Age: 37
End: 2022-10-21
Attending: OBSTETRICS & GYNECOLOGY
Payer: COMMERCIAL

## 2022-10-21 ENCOUNTER — HOSPITAL ENCOUNTER (OUTPATIENT)
Dept: ULTRASOUND IMAGING | Facility: CLINIC | Age: 37
Discharge: HOME | End: 2022-10-21
Attending: OBSTETRICS & GYNECOLOGY
Payer: COMMERCIAL

## 2022-10-21 DIAGNOSIS — O09.519 ADVANCED MATERNAL AGE, PRIMIGRAVIDA, ANTEPARTUM: ICD-10-CM

## 2022-10-21 DIAGNOSIS — O09.519 ADVANCED MATERNAL AGE, PRIMIGRAVIDA, ANTEPARTUM: Primary | ICD-10-CM

## 2022-10-21 PROCEDURE — 76811 OB US DETAILED SNGL FETUS: CPT | Mod: 26 | Performed by: OBSTETRICS & GYNECOLOGY

## 2022-10-21 PROCEDURE — 76811 OB US DETAILED SNGL FETUS: CPT

## 2022-10-21 NOTE — PROGRESS NOTES
Please refer to ultrasound report under 'Imaging' Studies of 'Chart Review' tabs.    Gato Montaño M.D.

## 2022-10-28 ENCOUNTER — PRENATAL OFFICE VISIT (OUTPATIENT)
Dept: OBGYN | Facility: CLINIC | Age: 37
End: 2022-10-28
Payer: COMMERCIAL

## 2022-10-28 VITALS
WEIGHT: 192.2 LBS | BODY MASS INDEX: 30.1 KG/M2 | HEART RATE: 81 BPM | SYSTOLIC BLOOD PRESSURE: 132 MMHG | DIASTOLIC BLOOD PRESSURE: 78 MMHG | OXYGEN SATURATION: 93 %

## 2022-10-28 DIAGNOSIS — Z23 HIGH PRIORITY FOR 2019-NCOV VACCINE: ICD-10-CM

## 2022-10-28 DIAGNOSIS — Z23 NEED FOR PROPHYLACTIC VACCINATION AND INOCULATION AGAINST INFLUENZA: ICD-10-CM

## 2022-10-28 PROBLEM — F41.1 GENERALIZED ANXIETY DISORDER: Status: ACTIVE | Noted: 2022-10-28

## 2022-10-28 PROBLEM — F32.A DEPRESSIVE DISORDER: Status: ACTIVE | Noted: 2022-10-28

## 2022-10-28 PROBLEM — F98.8 CHILD ATTENTION DEFICIT DISORDER: Status: ACTIVE | Noted: 2022-10-28

## 2022-10-28 PROCEDURE — 99207 PR PRENATAL VISIT: CPT | Performed by: OBSTETRICS & GYNECOLOGY

## 2022-10-28 PROCEDURE — 0124A COVID-19,PF,PFIZER BOOSTER BIVALENT: CPT | Performed by: OBSTETRICS & GYNECOLOGY

## 2022-10-28 PROCEDURE — 90471 IMMUNIZATION ADMIN: CPT | Performed by: OBSTETRICS & GYNECOLOGY

## 2022-10-28 PROCEDURE — 91312 COVID-19,PF,PFIZER BOOSTER BIVALENT: CPT | Performed by: OBSTETRICS & GYNECOLOGY

## 2022-10-28 PROCEDURE — 90686 IIV4 VACC NO PRSV 0.5 ML IM: CPT | Performed by: OBSTETRICS & GYNECOLOGY

## 2022-10-28 NOTE — PROGRESS NOTES
Doing well.   Fetal survey normal last week.   Having a boy.   Not feeling regular movement.   Checked mole on back - seborrheic keratosis  Recommend ASA due to first baby and AMA. Will start.    RTC 5 weeks. GCT then.

## 2022-11-30 ENCOUNTER — PRENATAL OFFICE VISIT (OUTPATIENT)
Dept: OBGYN | Facility: CLINIC | Age: 37
End: 2022-11-30
Payer: COMMERCIAL

## 2022-11-30 VITALS
DIASTOLIC BLOOD PRESSURE: 81 MMHG | OXYGEN SATURATION: 99 % | HEART RATE: 77 BPM | WEIGHT: 194 LBS | BODY MASS INDEX: 30.38 KG/M2 | SYSTOLIC BLOOD PRESSURE: 128 MMHG

## 2022-11-30 DIAGNOSIS — Z34.02 ENCOUNTER FOR SUPERVISION OF NORMAL FIRST PREGNANCY IN SECOND TRIMESTER: Primary | ICD-10-CM

## 2022-11-30 LAB
GLUCOSE 1H P 50 G GLC PO SERPL-MCNC: 68 MG/DL (ref 70–129)
HGB BLD-MCNC: 12.3 G/DL (ref 11.7–15.7)
HOLD SPECIMEN: NORMAL

## 2022-11-30 PROCEDURE — 36415 COLL VENOUS BLD VENIPUNCTURE: CPT | Performed by: OBSTETRICS & GYNECOLOGY

## 2022-11-30 PROCEDURE — 82950 GLUCOSE TEST: CPT | Performed by: OBSTETRICS & GYNECOLOGY

## 2022-11-30 PROCEDURE — 99207 PR PRENATAL VISIT: CPT | Performed by: OBSTETRICS & GYNECOLOGY

## 2022-11-30 NOTE — PROGRESS NOTES
24w0d overall feeling good, no c/o.  Still not feeling mvmt yet, discussed and reassured.  [place]well on doptone.  Discussed peds, circ, breastpump, CB classes.  Considering all.  glucola today.  RTC 4 weeks  jlp

## 2023-01-20 ENCOUNTER — PRENATAL OFFICE VISIT (OUTPATIENT)
Dept: OBGYN | Facility: CLINIC | Age: 38
End: 2023-01-20
Payer: COMMERCIAL

## 2023-01-20 VITALS
HEART RATE: 68 BPM | OXYGEN SATURATION: 99 % | SYSTOLIC BLOOD PRESSURE: 129 MMHG | DIASTOLIC BLOOD PRESSURE: 75 MMHG | WEIGHT: 203.2 LBS | BODY MASS INDEX: 31.83 KG/M2

## 2023-01-20 DIAGNOSIS — F41.9 ANXIETY: ICD-10-CM

## 2023-01-20 DIAGNOSIS — Z34.03 ENCOUNTER FOR SUPERVISION OF NORMAL FIRST PREGNANCY IN THIRD TRIMESTER: Primary | ICD-10-CM

## 2023-01-20 DIAGNOSIS — Z23 NEED FOR TDAP VACCINATION: ICD-10-CM

## 2023-01-20 PROCEDURE — 90471 IMMUNIZATION ADMIN: CPT | Performed by: OBSTETRICS & GYNECOLOGY

## 2023-01-20 PROCEDURE — 90715 TDAP VACCINE 7 YRS/> IM: CPT | Performed by: OBSTETRICS & GYNECOLOGY

## 2023-01-20 PROCEDURE — 99207 PR PRENATAL VISIT: CPT | Performed by: OBSTETRICS & GYNECOLOGY

## 2023-01-20 RX ORDER — HYDROXYZINE HYDROCHLORIDE 25 MG/1
25 TABLET, FILM COATED ORAL 3 TIMES DAILY PRN
Qty: 30 TABLET | Refills: 1 | Status: SHIPPED | OUTPATIENT
Start: 2023-01-20

## 2023-01-20 NOTE — Clinical Note
Gerson Gill  Christian has a long history of anxiety, takes xanax outside of pregnancy and overall has been stable, but definitely increasing and hoping to talk with you about some tools she could use since not currently taking her xanax.  Thanks! Maranda

## 2023-01-20 NOTE — PROGRESS NOTES
31w2d overall doing ok, but feeling more anxiety now and struggling with it.  She typically takes xanax when not pregnant and has not been taking.  Discussed trial of hydroxizine and she is agreeable, rx faxed.  Would also be interested in talking with Bridget.  Some thoughts about maybe just wanting a c/s as she is worried her anxiety will be worse during labor, I recommended she consider and if thinking strongly about it we can discuss more.  Tdap today. RTC 2 weeks  jlp

## 2023-01-24 ENCOUNTER — TELEPHONE (OUTPATIENT)
Dept: BEHAVIORAL HEALTH | Facility: CLINIC | Age: 38
End: 2023-01-24
Payer: COMMERCIAL

## 2023-01-24 NOTE — TELEPHONE ENCOUNTER
Patient outreach per OB referral. No answer, left VM and sent MyChart message with [facility] information.

## 2023-01-30 ENCOUNTER — VIRTUAL VISIT (OUTPATIENT)
Dept: BEHAVIORAL HEALTH | Facility: CLINIC | Age: 38
End: 2023-01-30
Payer: COMMERCIAL

## 2023-01-30 DIAGNOSIS — F43.23 ADJUSTMENT DISORDER WITH MIXED ANXIETY AND DEPRESSED MOOD: Primary | ICD-10-CM

## 2023-01-30 PROCEDURE — 90834 PSYTX W PT 45 MINUTES: CPT | Mod: VID

## 2023-01-30 ASSESSMENT — ANXIETY QUESTIONNAIRES
8. IF YOU CHECKED OFF ANY PROBLEMS, HOW DIFFICULT HAVE THESE MADE IT FOR YOU TO DO YOUR WORK, TAKE CARE OF THINGS AT HOME, OR GET ALONG WITH OTHER PEOPLE?: SOMEWHAT DIFFICULT
7. FEELING AFRAID AS IF SOMETHING AWFUL MIGHT HAPPEN: SEVERAL DAYS
3. WORRYING TOO MUCH ABOUT DIFFERENT THINGS: SEVERAL DAYS
1. FEELING NERVOUS, ANXIOUS, OR ON EDGE: SEVERAL DAYS
6. BECOMING EASILY ANNOYED OR IRRITABLE: SEVERAL DAYS
2. NOT BEING ABLE TO STOP OR CONTROL WORRYING: SEVERAL DAYS
GAD7 TOTAL SCORE: 7
GAD7 TOTAL SCORE: 7
7. FEELING AFRAID AS IF SOMETHING AWFUL MIGHT HAPPEN: SEVERAL DAYS
5. BEING SO RESTLESS THAT IT IS HARD TO SIT STILL: SEVERAL DAYS
4. TROUBLE RELAXING: SEVERAL DAYS
IF YOU CHECKED OFF ANY PROBLEMS ON THIS QUESTIONNAIRE, HOW DIFFICULT HAVE THESE PROBLEMS MADE IT FOR YOU TO DO YOUR WORK, TAKE CARE OF THINGS AT HOME, OR GET ALONG WITH OTHER PEOPLE: SOMEWHAT DIFFICULT
GAD7 TOTAL SCORE: 7

## 2023-01-30 ASSESSMENT — PATIENT HEALTH QUESTIONNAIRE - PHQ9
10. IF YOU CHECKED OFF ANY PROBLEMS, HOW DIFFICULT HAVE THESE PROBLEMS MADE IT FOR YOU TO DO YOUR WORK, TAKE CARE OF THINGS AT HOME, OR GET ALONG WITH OTHER PEOPLE: NOT DIFFICULT AT ALL
SUM OF ALL RESPONSES TO PHQ QUESTIONS 1-9: 3
SUM OF ALL RESPONSES TO PHQ QUESTIONS 1-9: 3

## 2023-01-30 NOTE — PROGRESS NOTES
[facility]   2023  Behavioral Health Clinician Progress Note    Patient Name: Christian Nuñez         Service Type:  Individual      Service Location:   [facility] / Email (patient reached)     Session Start Time: 11:00 AM  Session End Time: 11:50 AM      Session Length: 38 - 52      Attendees: Patient     Service Modality:  Video Visit:      Provider verified identity through the following two step process.  Patient provided:  Patient  and Patient address    Telemedicine Visit: The patient's condition can be safely assessed and treated via synchronous audio and visual telemedicine encounter.      Reason for Telemedicine Visit: Patient has requested telehealth visit and Patient convenience (e.g. access to timely appointments / distance to available provider)    Originating Site (Patient Location): Patient's [place]    [place] [facility] [place]:  The patient/guardian has verbally consented to: the potential risks and benefits of telemedicine (video visit) versus in person care; bill my insurance or make self-payment for services provided; and responsibility for payment of non-covered services.     Patient would like the video invitation sent by:  My Chart    Mode of Communication:  Video Conference via [facility]    Distant Location (Provider):  On-site    As the provider I attest to compliance with applicable laws and regulations related to telemedicine.    Visit Activities (Refresh list every visit): [facility] and [facility] Only    Diagnostic Assessment Date: Not yet completed  Treatment Plan Review Date: Not yet created  See Flowsheets for today's PHQ-9 and CRISTOBAL-7 results  Previous PHQ-9:   PHQ-9 SCORE 2022   PHQ-9 Total Score MyChart - 7 (Mild depression) 3 (Minimal depression)   PHQ-9 Total Score 4 7 3     Previous CRISTOBAL-7:   CRISTOBAL-7 SCORE 2022   Total Score - 8 (mild anxiety) 7 (mild anxiety)   Total  "Score 5 8 7       RONDA LEVEL:  No flowsheet data found.    DATA  Extended Session (60+ minutes): No  Interactive Complexity: No  Crisis: No  [facility] Patient: No    Treatment Objective(s) Addressed in This Session:  Target Behavior(s): management of mental health symptoms during pregnancy    Anxiety: will develop more effective coping skills to manage anxiety symptoms and will develop healthy cognitive patterns and beliefs  Adjustment Difficulties: will develop coping/problem-solving skills to facilitate more adaptive adjustment     Current Stressors / Issues:  Patient was referred by OB provider to address concerns of anxiety and occasional depressed mood, in the context of adjustment within pregnancy. This is her first pregnancy. She endorsed worry and stress related to both pregnancy and other topics: postpartum recovery and body changes/body image, how identity and overall routines and functioning will change with parenthood, where she wants to be in her career, and recent strain with close friends. She described feeling down when she feels \"stuck\", irritability, and fatigue (which she believes may also be attributable to work and pregnancy). [facility] guided discussion of patient's beliefs and expectations, assisted in identifying/re-framing unjustified shame associated with these various concerns. Patient expressed curiosity about the occurrence of intrusive thoughts related to self-harm earlier this month. 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 She described these thoughts as highly distressing and not anything she would want to act out. [facility] provided psycho-education on intrusive thoughts to support reducing distress and affirmed patient's openness to discuss this concern.  She reported history of anxiety, depression, and ADHD. " Patient noted that some changes in mental health symptoms/functioning may be related to discontinuation of ADHD meds during pregnancy. Denied current/history of SI and SIB.     Progress on Treatment Objective(s) / Homework:  New Objective established this session - PREPARATION (Decided to change - considering how); Intervened by negotiating a change plan and determining options / strategies for behavior change, identifying triggers, exploring social supports, and working towards setting a date to begin behavior change    Motivational Interviewing    MI Intervention: Expressed Empathy/Understanding, Supported Autonomy, Collaboration, Evocation, Open-ended questions, Reflections: simple and complex and Reframe     Change Talk Expressed by the Patient: Desire to change Ability to change Reasons to change Taking steps    Provider Response to Change Talk: E - Evoked more info from patient about behavior change, A - Affirmed patient's thoughts, decisions, or attempts at behavior change, R - Reflected patient's change talk and S - Summarized patient's change talk statements    Also provided psychoeducation about behavioral health condition, symptoms, and treatment options    Care Plan review completed: No    Medication Review:  Was prescribed PRN hydroxyzine  Considering whether a daily medication would be helpful for symptom management    Medication Compliance:  N/A    Changes in Health Issues:  Currently pregnant - 32w5d    Chemical Use Review:   Substance Use: Chemical use reviewed, no active concerns identified      Tobacco Use: No current tobacco use.      Assessment: Current Emotional / Mental Status (status of significant symptoms):  Risk status (Self / Other harm or suicidal ideation)  Patient denies a history of suicidal ideation, suicide attempts, self-injurious behavior, homicidal ideation, homicidal behavior and and other safety concerns  Patient denies current fears or concerns for personal safety.  Patient  denies current or recent suicidal ideation or behaviors.  Patient denies current or recent homicidal ideation or behaviors.  Patient denies current or recent self injurious behavior or ideation.  Patient denies other safety concerns.   Patient did endorse a few instances of intrusive thoughts related to self-harm earlier this month (cutting, falling down the stairs). She described these thoughts as unusual for her and disturbing. She denied any desire to harm herself, thoughts of being better off dead, or concerns of acting on these thoughts. No safety concerns indicated based on patient's description of thoughts.   A safety and risk management plan has not been developed at this time, however patient was encouraged to call [place] [phone] should there be a change in any of these risk factors.    Appearance:   Appropriate   Eye Contact:   Good   Psychomotor Behavior: Normal   Attitude:   Cooperative  Interested Pleasant  Orientation:   All  Speech   Rate / Production: Normal/ Responsive   Volume:  Normal   Mood:    Intermittent anxiety and depressed mood  Affect:    Appropriate   Thought Content:  Clear   Thought Form:  Coherent  Goal Directed  Logical   Insight:    Good     Diagnoses:  1. Adjustment disorder with mixed anxiety and depressed mood    Provisional     Collateral Reports Completed:  Not Applicable    Plan: (Homework, other):  Follow-up scheduled to assess mood/anxiety symptoms. No mental health referral in place at this time, will continue to discuss ongoing support interests with patient at next visit. Tentative plan to complete diagnostic assessment. CD Recommendations: No indications of CD issues.     Bridget Tucker, LEVY, [facility]

## 2023-02-02 ENCOUNTER — PRENATAL OFFICE VISIT (OUTPATIENT)
Dept: OBGYN | Facility: CLINIC | Age: 38
End: 2023-02-02
Payer: COMMERCIAL

## 2023-02-02 VITALS
SYSTOLIC BLOOD PRESSURE: 127 MMHG | HEART RATE: 76 BPM | OXYGEN SATURATION: 100 % | BODY MASS INDEX: 31.95 KG/M2 | WEIGHT: 204 LBS | DIASTOLIC BLOOD PRESSURE: 74 MMHG

## 2023-02-02 DIAGNOSIS — Z34.03 ENCOUNTER FOR SUPERVISION OF NORMAL FIRST PREGNANCY IN THIRD TRIMESTER: Primary | ICD-10-CM

## 2023-02-02 PROCEDURE — 99207 PR PRENATAL VISIT: CPT | Performed by: OBSTETRICS & GYNECOLOGY

## 2023-02-02 NOTE — PROGRESS NOTES
33w1d overall feeling good, no c/o.  Good fm.  Has not tried the hydroxizine yet, anxiety has been ok.  Did talk with Rashid, which she felt was helpful.  Some questions about hospital bag, etc, discussed.  Taking a CB class next week.  RTC 2 weeks  berhane

## 2023-02-16 ENCOUNTER — PRENATAL OFFICE VISIT (OUTPATIENT)
Dept: OBGYN | Facility: CLINIC | Age: 38
End: 2023-02-16
Payer: COMMERCIAL

## 2023-02-16 VITALS
DIASTOLIC BLOOD PRESSURE: 79 MMHG | BODY MASS INDEX: 32.73 KG/M2 | HEART RATE: 80 BPM | OXYGEN SATURATION: 100 % | WEIGHT: 209 LBS | SYSTOLIC BLOOD PRESSURE: 135 MMHG

## 2023-02-16 DIAGNOSIS — Z34.03 ENCOUNTER FOR SUPERVISION OF NORMAL FIRST PREGNANCY IN THIRD TRIMESTER: Primary | ICD-10-CM

## 2023-02-16 PROCEDURE — 99207 PR PRENATAL VISIT: CPT | Performed by: OBSTETRICS & GYNECOLOGY

## 2023-02-16 NOTE — PROGRESS NOTES
35w1d feeling ok, getting a little more uncomfortable now.  Good fm.  Discussed GBS next week.  Wondering what happens if baby is breech, very briefly discussed ECV vs c/s.  RTC 1-2w  berhane

## 2023-02-28 ENCOUNTER — PRENATAL OFFICE VISIT (OUTPATIENT)
Dept: OBGYN | Facility: CLINIC | Age: 38
End: 2023-02-28
Payer: COMMERCIAL

## 2023-02-28 VITALS
WEIGHT: 209 LBS | HEART RATE: 81 BPM | SYSTOLIC BLOOD PRESSURE: 131 MMHG | DIASTOLIC BLOOD PRESSURE: 84 MMHG | BODY MASS INDEX: 32.73 KG/M2 | OXYGEN SATURATION: 97 %

## 2023-02-28 DIAGNOSIS — Z34.03 ENCOUNTER FOR SUPERVISION OF NORMAL FIRST PREGNANCY IN THIRD TRIMESTER: Primary | ICD-10-CM

## 2023-02-28 LAB
HGB BLD-MCNC: 12.5 G/DL (ref 11.7–15.7)
HOLD SPECIMEN: NORMAL

## 2023-02-28 PROCEDURE — 99207 PR PRENATAL VISIT: CPT | Performed by: OBSTETRICS & GYNECOLOGY

## 2023-02-28 PROCEDURE — 87653 STREP B DNA AMP PROBE: CPT | Performed by: OBSTETRICS & GYNECOLOGY

## 2023-02-28 PROCEDURE — 36415 COLL VENOUS BLD VENIPUNCTURE: CPT | Performed by: OBSTETRICS & GYNECOLOGY

## 2023-02-28 NOTE — PROGRESS NOTES
36w6d feeling ok, baby seems to have dropped as she has more space upper abd now.  More back discomfort.  Good fm.  No ctx that she can tell.  BSUS: cephalic.  GBS today.  RTC weekly  berhane

## 2023-03-01 LAB — GP B STREP DNA SPEC QL NAA+PROBE: NEGATIVE

## 2023-03-06 ENCOUNTER — HOSPITAL ENCOUNTER (OUTPATIENT)
Age: 38
End: 2023-03-06
Attending: OBSTETRICS & GYNECOLOGY | Admitting: OBSTETRICS & GYNECOLOGY
Payer: COMMERCIAL

## 2023-03-09 ENCOUNTER — PRENATAL OFFICE VISIT (OUTPATIENT)
Dept: OBGYN | Facility: CLINIC | Age: 38
End: 2023-03-09
Payer: COMMERCIAL

## 2023-03-09 VITALS — WEIGHT: 210 LBS | BODY MASS INDEX: 32.89 KG/M2 | DIASTOLIC BLOOD PRESSURE: 78 MMHG | SYSTOLIC BLOOD PRESSURE: 126 MMHG

## 2023-03-09 DIAGNOSIS — Z34.03 ENCOUNTER FOR SUPERVISION OF NORMAL FIRST PREGNANCY IN THIRD TRIMESTER: Primary | ICD-10-CM

## 2023-03-09 DIAGNOSIS — Z11.59 SCREENING FOR VIRAL DISEASE: ICD-10-CM

## 2023-03-09 PROCEDURE — 99207 PR PRENATAL VISIT: CPT | Performed by: OBSTETRICS & GYNECOLOGY

## 2023-03-09 RX ORDER — ONDANSETRON 4 MG/1
4 TABLET, ORALLY DISINTEGRATING ORAL EVERY 6 HOURS PRN
Status: CANCELLED | OUTPATIENT
Start: 2023-03-09

## 2023-03-09 RX ORDER — CITRIC ACID/SODIUM CITRATE 334-500MG
30 SOLUTION, ORAL ORAL
Status: CANCELLED | OUTPATIENT
Start: 2023-03-09

## 2023-03-09 RX ORDER — OXYTOCIN 10 [USP'U]/ML
10 INJECTION, SOLUTION INTRAMUSCULAR; INTRAVENOUS
Status: CANCELLED | OUTPATIENT
Start: 2023-03-09

## 2023-03-09 RX ORDER — NALOXONE HYDROCHLORIDE 0.4 MG/ML
0.4 INJECTION, SOLUTION INTRAMUSCULAR; INTRAVENOUS; SUBCUTANEOUS
Status: CANCELLED | OUTPATIENT
Start: 2023-03-09

## 2023-03-09 RX ORDER — CARBOPROST TROMETHAMINE 250 UG/ML
250 INJECTION, SOLUTION INTRAMUSCULAR
Status: CANCELLED | OUTPATIENT
Start: 2023-03-09

## 2023-03-09 RX ORDER — KETOROLAC TROMETHAMINE 30 MG/ML
30 INJECTION, SOLUTION INTRAMUSCULAR; INTRAVENOUS
Status: CANCELLED | OUTPATIENT
Start: 2023-03-09 | End: 2023-03-14

## 2023-03-09 RX ORDER — OXYTOCIN/0.9 % SODIUM CHLORIDE 30/500 ML
340 PLASTIC BAG, INJECTION (ML) INTRAVENOUS CONTINUOUS PRN
Status: CANCELLED | OUTPATIENT
Start: 2023-03-09

## 2023-03-09 RX ORDER — NALOXONE HYDROCHLORIDE 0.4 MG/ML
0.2 INJECTION, SOLUTION INTRAMUSCULAR; INTRAVENOUS; SUBCUTANEOUS
Status: CANCELLED | OUTPATIENT
Start: 2023-03-09

## 2023-03-09 RX ORDER — CITRIC ACID/SODIUM CITRATE 334-500MG
30 SOLUTION, ORAL ORAL ONCE
Status: CANCELLED | OUTPATIENT
Start: 2023-03-09 | End: 2023-03-09

## 2023-03-09 RX ORDER — OXYTOCIN/0.9 % SODIUM CHLORIDE 30/500 ML
100-340 PLASTIC BAG, INJECTION (ML) INTRAVENOUS CONTINUOUS PRN
Status: CANCELLED | OUTPATIENT
Start: 2023-03-09

## 2023-03-09 RX ORDER — ONDANSETRON 2 MG/ML
4 INJECTION INTRAMUSCULAR; INTRAVENOUS EVERY 6 HOURS PRN
Status: CANCELLED | OUTPATIENT
Start: 2023-03-09

## 2023-03-09 RX ORDER — MISOPROSTOL 100 UG/1
400 TABLET ORAL
Status: CANCELLED | OUTPATIENT
Start: 2023-03-09

## 2023-03-09 RX ORDER — IBUPROFEN 200 MG
800 TABLET ORAL
Status: CANCELLED | OUTPATIENT
Start: 2023-03-09 | End: 2023-03-14

## 2023-03-09 RX ORDER — METHYLERGONOVINE MALEATE 0.2 MG/ML
200 INJECTION INTRAVENOUS
Status: CANCELLED | OUTPATIENT
Start: 2023-03-09

## 2023-03-09 RX ORDER — ACETAMINOPHEN 325 MG/1
650 TABLET ORAL EVERY 4 HOURS PRN
Status: CANCELLED | OUTPATIENT
Start: 2023-03-09

## 2023-03-09 RX ORDER — PROCHLORPERAZINE 25 MG
25 SUPPOSITORY, RECTAL RECTAL EVERY 12 HOURS PRN
Status: CANCELLED | OUTPATIENT
Start: 2023-03-09

## 2023-03-09 RX ORDER — PROCHLORPERAZINE MALEATE 5 MG
10 TABLET ORAL EVERY 6 HOURS PRN
Status: CANCELLED | OUTPATIENT
Start: 2023-03-09

## 2023-03-09 RX ORDER — MISOPROSTOL 200 UG/1
800 TABLET ORAL
Status: CANCELLED | OUTPATIENT
Start: 2023-03-09

## 2023-03-09 NOTE — PROGRESS NOTES
38w1d feeling ok, ready to be done.  Some nausea and diarrhea last few days, so hoping it means labor is coming.  Good fm.  No ctx. Interested in IOL on her EDC if possible.  Scheduled for 3/22 at 7:30am.  Discussed ripening process, typical timelines, etc.  She is op[en to c/s if needed and would rather intervene early if seems like she is headed in that direction than delaying.   Labor instructions and kick counts reviewed. RTC weekly  berhane

## 2023-03-09 NOTE — Clinical Note
Gerson Nicholas,  Christian is scheduled for IOL on 3/22, will likely need ripening.  She is really nice.  She has been a little nervous about labor, considered elective c/s for a time, but feels comfortable now.  She wants you to know if it seems like she is headed toward a c/s, she would rather do it sooner than later.  Thanks!  coleman

## 2023-03-16 ENCOUNTER — PRENATAL OFFICE VISIT (OUTPATIENT)
Dept: OBGYN | Facility: CLINIC | Age: 38
End: 2023-03-16
Payer: COMMERCIAL

## 2023-03-16 ENCOUNTER — HOSPITAL ENCOUNTER (INPATIENT)
Facility: CLINIC | Age: 38
LOS: 5 days | Discharge: HOME HEALTH | End: 2023-03-21
Attending: OBSTETRICS & GYNECOLOGY | Admitting: OBSTETRICS & GYNECOLOGY
Payer: COMMERCIAL

## 2023-03-16 ENCOUNTER — ANESTHESIA (OUTPATIENT)
Dept: OBGYN | Facility: CLINIC | Age: 38
End: 2023-03-16
Payer: COMMERCIAL

## 2023-03-16 ENCOUNTER — ANESTHESIA EVENT (OUTPATIENT)
Dept: OBGYN | Facility: CLINIC | Age: 38
End: 2023-03-16
Payer: COMMERCIAL

## 2023-03-16 VITALS
WEIGHT: 212 LBS | OXYGEN SATURATION: 100 % | HEART RATE: 71 BPM | BODY MASS INDEX: 33.2 KG/M2 | DIASTOLIC BLOOD PRESSURE: 80 MMHG | SYSTOLIC BLOOD PRESSURE: 142 MMHG

## 2023-03-16 DIAGNOSIS — O14.10 PREECLAMPSIA, SEVERE, UNSPECIFIED TRIMESTER: ICD-10-CM

## 2023-03-16 DIAGNOSIS — Z34.03 ENCOUNTER FOR SUPERVISION OF NORMAL FIRST PREGNANCY IN THIRD TRIMESTER: Primary | ICD-10-CM

## 2023-03-16 LAB
ABO/RH(D): NORMAL
ALBUMIN MFR UR ELPH: <4 MG/DL (ref 1–14)
ALT SERPL W P-5'-P-CCNC: 51 U/L (ref 10–35)
ANTIBODY SCREEN: NEGATIVE
AST SERPL W P-5'-P-CCNC: 27 U/L (ref 10–35)
CREAT SERPL-MCNC: 0.7 MG/DL (ref 0.51–0.95)
CREAT UR-MCNC: 25.3 MG/DL
ERYTHROCYTE [DISTWIDTH] IN BLOOD BY AUTOMATED COUNT: 12.7 % (ref 10–15)
GFR SERPL CREATININE-BSD FRML MDRD: >90 ML/MIN/1.73M2
HCT VFR BLD AUTO: 38.4 % (ref 35–47)
HGB BLD-MCNC: 13.3 G/DL (ref 11.7–15.7)
MCH RBC QN AUTO: 33.2 PG (ref 26.5–33)
MCHC RBC AUTO-ENTMCNC: 34.6 G/DL (ref 31.5–36.5)
MCV RBC AUTO: 96 FL (ref 78–100)
PLATELET # BLD AUTO: 176 10E3/UL (ref 150–450)
PROT/CREAT 24H UR: NORMAL MG/G{CREAT}
RBC # BLD AUTO: 4.01 10E6/UL (ref 3.8–5.2)
SARS-COV-2 RNA RESP QL NAA+PROBE: NEGATIVE
SPECIMEN EXPIRATION DATE: NORMAL
WBC # BLD AUTO: 8.7 10E3/UL (ref 4–11)

## 2023-03-16 PROCEDURE — 710N000010 HC RECOVERY PHASE 1, LEVEL 2, PER MIN: Performed by: OBSTETRICS & GYNECOLOGY

## 2023-03-16 PROCEDURE — U0003 INFECTIOUS AGENT DETECTION BY NUCLEIC ACID (DNA OR RNA); SEVERE ACUTE RESPIRATORY SYNDROME CORONAVIRUS 2 (SARS-COV-2) (CORONAVIRUS DISEASE [COVID-19]), AMPLIFIED PROBE TECHNIQUE, MAKING USE OF HIGH THROUGHPUT TECHNOLOGIES AS DESCRIBED BY CMS-2020-01-R: HCPCS

## 2023-03-16 PROCEDURE — C9803 HOPD COVID-19 SPEC COLLECT: HCPCS

## 2023-03-16 PROCEDURE — 120N000002 HC R&B MED SURG/OB UMMC

## 2023-03-16 PROCEDURE — 84156 ASSAY OF PROTEIN URINE: CPT | Performed by: OBSTETRICS & GYNECOLOGY

## 2023-03-16 PROCEDURE — 250N000011 HC RX IP 250 OP 636: Performed by: ANESTHESIOLOGY

## 2023-03-16 PROCEDURE — 258N000003 HC RX IP 258 OP 636: Performed by: OBSTETRICS & GYNECOLOGY

## 2023-03-16 PROCEDURE — 59510 CESAREAN DELIVERY: CPT | Mod: GC | Performed by: OBSTETRICS & GYNECOLOGY

## 2023-03-16 PROCEDURE — 85027 COMPLETE CBC AUTOMATED: CPT | Performed by: OBSTETRICS & GYNECOLOGY

## 2023-03-16 PROCEDURE — 84450 TRANSFERASE (AST) (SGOT): CPT | Performed by: OBSTETRICS & GYNECOLOGY

## 2023-03-16 PROCEDURE — 360N000076 HC SURGERY LEVEL 3, PER MIN: Performed by: OBSTETRICS & GYNECOLOGY

## 2023-03-16 PROCEDURE — 271N000001 HC OR GENERAL SUPPLY NON-STERILE: Performed by: OBSTETRICS & GYNECOLOGY

## 2023-03-16 PROCEDURE — 250N000013 HC RX MED GY IP 250 OP 250 PS 637

## 2023-03-16 PROCEDURE — 86901 BLOOD TYPING SEROLOGIC RH(D): CPT | Performed by: OBSTETRICS & GYNECOLOGY

## 2023-03-16 PROCEDURE — 999N000141 HC STATISTIC PRE-PROCEDURE NURSING ASSESSMENT: Performed by: OBSTETRICS & GYNECOLOGY

## 2023-03-16 PROCEDURE — 272N000001 HC OR GENERAL SUPPLY STERILE: Performed by: OBSTETRICS & GYNECOLOGY

## 2023-03-16 PROCEDURE — 84460 ALANINE AMINO (ALT) (SGPT): CPT | Performed by: OBSTETRICS & GYNECOLOGY

## 2023-03-16 PROCEDURE — 250N000011 HC RX IP 250 OP 636: Performed by: OBSTETRICS & GYNECOLOGY

## 2023-03-16 PROCEDURE — 82565 ASSAY OF CREATININE: CPT | Performed by: OBSTETRICS & GYNECOLOGY

## 2023-03-16 PROCEDURE — 3E0P7VZ INTRODUCTION OF HORMONE INTO FEMALE REPRODUCTIVE, VIA NATURAL OR ARTIFICIAL OPENING: ICD-10-PCS | Performed by: OBSTETRICS & GYNECOLOGY

## 2023-03-16 PROCEDURE — 370N000017 HC ANESTHESIA TECHNICAL FEE, PER MIN: Performed by: OBSTETRICS & GYNECOLOGY

## 2023-03-16 PROCEDURE — 250N000009 HC RX 250

## 2023-03-16 PROCEDURE — 86780 TREPONEMA PALLIDUM: CPT | Performed by: OBSTETRICS & GYNECOLOGY

## 2023-03-16 PROCEDURE — 250N000011 HC RX IP 250 OP 636

## 2023-03-16 PROCEDURE — 250N000013 HC RX MED GY IP 250 OP 250 PS 637: Performed by: OBSTETRICS & GYNECOLOGY

## 2023-03-16 PROCEDURE — 258N000003 HC RX IP 258 OP 636

## 2023-03-16 PROCEDURE — 99207 PR PRENATAL VISIT: CPT | Performed by: OBSTETRICS & GYNECOLOGY

## 2023-03-16 RX ORDER — CEFAZOLIN SODIUM/WATER 2 G/20 ML
2 SYRINGE (ML) INTRAVENOUS SEE ADMIN INSTRUCTIONS
Status: DISCONTINUED | OUTPATIENT
Start: 2023-03-16 | End: 2023-03-17 | Stop reason: HOSPADM

## 2023-03-16 RX ORDER — NALOXONE HYDROCHLORIDE 0.4 MG/ML
0.2 INJECTION, SOLUTION INTRAMUSCULAR; INTRAVENOUS; SUBCUTANEOUS
Status: DISCONTINUED | OUTPATIENT
Start: 2023-03-16 | End: 2023-03-17

## 2023-03-16 RX ORDER — MISOPROSTOL 200 UG/1
800 TABLET ORAL
Status: DISCONTINUED | OUTPATIENT
Start: 2023-03-16 | End: 2023-03-17

## 2023-03-16 RX ORDER — OXYTOCIN/0.9 % SODIUM CHLORIDE 30/500 ML
340 PLASTIC BAG, INJECTION (ML) INTRAVENOUS CONTINUOUS PRN
Status: DISCONTINUED | OUTPATIENT
Start: 2023-03-16 | End: 2023-03-17

## 2023-03-16 RX ORDER — HYDROXYZINE HYDROCHLORIDE 50 MG/1
50 TABLET, FILM COATED ORAL
Status: DISCONTINUED | OUTPATIENT
Start: 2023-03-16 | End: 2023-03-17

## 2023-03-16 RX ORDER — ONDANSETRON 2 MG/ML
4 INJECTION INTRAMUSCULAR; INTRAVENOUS EVERY 6 HOURS PRN
Status: DISCONTINUED | OUTPATIENT
Start: 2023-03-16 | End: 2023-03-17

## 2023-03-16 RX ORDER — ONDANSETRON 4 MG/1
4 TABLET, ORALLY DISINTEGRATING ORAL EVERY 6 HOURS PRN
Status: DISCONTINUED | OUTPATIENT
Start: 2023-03-16 | End: 2023-03-17

## 2023-03-16 RX ORDER — MISOPROSTOL 200 UG/1
400 TABLET ORAL
Status: DISCONTINUED | OUTPATIENT
Start: 2023-03-16 | End: 2023-03-17 | Stop reason: HOSPADM

## 2023-03-16 RX ORDER — MISOPROSTOL 200 UG/1
400 TABLET ORAL
Status: DISCONTINUED | OUTPATIENT
Start: 2023-03-16 | End: 2023-03-17

## 2023-03-16 RX ORDER — SODIUM CHLORIDE, SODIUM LACTATE, POTASSIUM CHLORIDE, CALCIUM CHLORIDE 600; 310; 30; 20 MG/100ML; MG/100ML; MG/100ML; MG/100ML
INJECTION, SOLUTION INTRAVENOUS CONTINUOUS
Status: DISCONTINUED | OUTPATIENT
Start: 2023-03-16 | End: 2023-03-17 | Stop reason: HOSPADM

## 2023-03-16 RX ORDER — MISOPROSTOL 200 UG/1
800 TABLET ORAL
Status: DISCONTINUED | OUTPATIENT
Start: 2023-03-16 | End: 2023-03-17 | Stop reason: HOSPADM

## 2023-03-16 RX ORDER — OXYTOCIN/0.9 % SODIUM CHLORIDE 30/500 ML
100-340 PLASTIC BAG, INJECTION (ML) INTRAVENOUS CONTINUOUS PRN
Status: DISCONTINUED | OUTPATIENT
Start: 2023-03-16 | End: 2023-03-17

## 2023-03-16 RX ORDER — CITRIC ACID/SODIUM CITRATE 334-500MG
30 SOLUTION, ORAL ORAL
Status: COMPLETED | OUTPATIENT
Start: 2023-03-16 | End: 2023-03-16

## 2023-03-16 RX ORDER — FENTANYL/ROPIVACAINE/NS/PF 2MCG/ML-.1
PLASTIC BAG, INJECTION (ML) EPIDURAL
Status: DISCONTINUED | OUTPATIENT
Start: 2023-03-16 | End: 2023-03-16

## 2023-03-16 RX ORDER — FENTANYL CITRATE-0.9 % NACL/PF 10 MCG/ML
100 PLASTIC BAG, INJECTION (ML) INTRAVENOUS EVERY 5 MIN PRN
Status: DISCONTINUED | OUTPATIENT
Start: 2023-03-16 | End: 2023-03-17

## 2023-03-16 RX ORDER — KETOROLAC TROMETHAMINE 30 MG/ML
30 INJECTION, SOLUTION INTRAMUSCULAR; INTRAVENOUS
Status: DISCONTINUED | OUTPATIENT
Start: 2023-03-16 | End: 2023-03-17

## 2023-03-16 RX ORDER — SODIUM CHLORIDE, SODIUM LACTATE, POTASSIUM CHLORIDE, CALCIUM CHLORIDE 600; 310; 30; 20 MG/100ML; MG/100ML; MG/100ML; MG/100ML
INJECTION, SOLUTION INTRAVENOUS CONTINUOUS PRN
Status: DISCONTINUED | OUTPATIENT
Start: 2023-03-16 | End: 2023-03-17

## 2023-03-16 RX ORDER — CARBOPROST TROMETHAMINE 250 UG/ML
250 INJECTION, SOLUTION INTRAMUSCULAR
Status: DISCONTINUED | OUTPATIENT
Start: 2023-03-16 | End: 2023-03-17 | Stop reason: HOSPADM

## 2023-03-16 RX ORDER — NALBUPHINE HYDROCHLORIDE 10 MG/ML
2.5-5 INJECTION, SOLUTION INTRAMUSCULAR; INTRAVENOUS; SUBCUTANEOUS EVERY 6 HOURS PRN
Status: DISCONTINUED | OUTPATIENT
Start: 2023-03-16 | End: 2023-03-17

## 2023-03-16 RX ORDER — PROCHLORPERAZINE 25 MG
25 SUPPOSITORY, RECTAL RECTAL EVERY 12 HOURS PRN
Status: DISCONTINUED | OUTPATIENT
Start: 2023-03-16 | End: 2023-03-17

## 2023-03-16 RX ORDER — IBUPROFEN 800 MG/1
800 TABLET, FILM COATED ORAL
Status: DISCONTINUED | OUTPATIENT
Start: 2023-03-16 | End: 2023-03-17

## 2023-03-16 RX ORDER — ASPIRIN 81 MG/1
81 TABLET, CHEWABLE ORAL DAILY
COMMUNITY
End: 2023-04-26

## 2023-03-16 RX ORDER — OXYTOCIN/0.9 % SODIUM CHLORIDE 30/500 ML
340 PLASTIC BAG, INJECTION (ML) INTRAVENOUS CONTINUOUS PRN
Status: DISCONTINUED | OUTPATIENT
Start: 2023-03-16 | End: 2023-03-17 | Stop reason: HOSPADM

## 2023-03-16 RX ORDER — CEFAZOLIN SODIUM/WATER 2 G/20 ML
2 SYRINGE (ML) INTRAVENOUS
Status: COMPLETED | OUTPATIENT
Start: 2023-03-16 | End: 2023-03-16

## 2023-03-16 RX ORDER — FENTANYL CITRATE 50 UG/ML
INJECTION, SOLUTION INTRAMUSCULAR; INTRAVENOUS
Status: COMPLETED | OUTPATIENT
Start: 2023-03-16 | End: 2023-03-16

## 2023-03-16 RX ORDER — NALOXONE HYDROCHLORIDE 0.4 MG/ML
0.4 INJECTION, SOLUTION INTRAMUSCULAR; INTRAVENOUS; SUBCUTANEOUS
Status: DISCONTINUED | OUTPATIENT
Start: 2023-03-16 | End: 2023-03-17

## 2023-03-16 RX ORDER — OXYTOCIN 10 [USP'U]/ML
10 INJECTION, SOLUTION INTRAMUSCULAR; INTRAVENOUS
Status: DISCONTINUED | OUTPATIENT
Start: 2023-03-16 | End: 2023-03-17 | Stop reason: HOSPADM

## 2023-03-16 RX ORDER — MORPHINE SULFATE 1 MG/ML
INJECTION, SOLUTION EPIDURAL; INTRATHECAL; INTRAVENOUS
Status: COMPLETED | OUTPATIENT
Start: 2023-03-16 | End: 2023-03-16

## 2023-03-16 RX ORDER — AZITHROMYCIN 250 MG/1
TABLET, FILM COATED ORAL
Status: ON HOLD | COMMUNITY
End: 2023-03-18

## 2023-03-16 RX ORDER — CYCLOBENZAPRINE HCL 10 MG
TABLET ORAL
Status: ON HOLD | COMMUNITY
End: 2023-03-18

## 2023-03-16 RX ORDER — CITRIC ACID/SODIUM CITRATE 334-500MG
30 SOLUTION, ORAL ORAL
Status: DISCONTINUED | OUTPATIENT
Start: 2023-03-16 | End: 2023-03-17

## 2023-03-16 RX ORDER — ACETAMINOPHEN 325 MG/1
975 TABLET ORAL ONCE
Status: COMPLETED | OUTPATIENT
Start: 2023-03-16 | End: 2023-03-16

## 2023-03-16 RX ORDER — METHYLERGONOVINE MALEATE 0.2 MG/ML
200 INJECTION INTRAVENOUS
Status: DISCONTINUED | OUTPATIENT
Start: 2023-03-16 | End: 2023-03-17

## 2023-03-16 RX ORDER — MISOPROSTOL 100 UG/1
25 TABLET ORAL
Status: DISCONTINUED | OUTPATIENT
Start: 2023-03-16 | End: 2023-03-17

## 2023-03-16 RX ORDER — MISOPROSTOL 100 UG/1
25 TABLET ORAL EVERY 4 HOURS PRN
Status: DISCONTINUED | OUTPATIENT
Start: 2023-03-16 | End: 2023-03-17

## 2023-03-16 RX ORDER — BUPIVACAINE HYDROCHLORIDE 7.5 MG/ML
INJECTION, SOLUTION INTRASPINAL
Status: COMPLETED | OUTPATIENT
Start: 2023-03-16 | End: 2023-03-16

## 2023-03-16 RX ORDER — TERBUTALINE SULFATE 1 MG/ML
0.25 INJECTION, SOLUTION SUBCUTANEOUS
Status: DISCONTINUED | OUTPATIENT
Start: 2023-03-16 | End: 2023-03-17

## 2023-03-16 RX ORDER — OXYTOCIN 10 [USP'U]/ML
10 INJECTION, SOLUTION INTRAMUSCULAR; INTRAVENOUS
Status: DISCONTINUED | OUTPATIENT
Start: 2023-03-16 | End: 2023-03-17

## 2023-03-16 RX ORDER — MORPHINE SULFATE 10 MG/ML
10 INJECTION, SOLUTION INTRAMUSCULAR; INTRAVENOUS
Status: DISCONTINUED | OUTPATIENT
Start: 2023-03-16 | End: 2023-03-17

## 2023-03-16 RX ORDER — LIDOCAINE 40 MG/G
CREAM TOPICAL
Status: DISCONTINUED | OUTPATIENT
Start: 2023-03-16 | End: 2023-03-17 | Stop reason: HOSPADM

## 2023-03-16 RX ORDER — LIDOCAINE 40 MG/G
CREAM TOPICAL
Status: DISCONTINUED | OUTPATIENT
Start: 2023-03-16 | End: 2023-03-17

## 2023-03-16 RX ORDER — BENZONATATE 100 MG/1
CAPSULE ORAL
Status: ON HOLD | COMMUNITY
End: 2023-03-18

## 2023-03-16 RX ORDER — TRANEXAMIC ACID 10 MG/ML
1 INJECTION, SOLUTION INTRAVENOUS EVERY 30 MIN PRN
Status: DISCONTINUED | OUTPATIENT
Start: 2023-03-16 | End: 2023-03-17

## 2023-03-16 RX ORDER — OXYTOCIN/0.9 % SODIUM CHLORIDE 30/500 ML
PLASTIC BAG, INJECTION (ML) INTRAVENOUS CONTINUOUS PRN
Status: DISCONTINUED | OUTPATIENT
Start: 2023-03-16 | End: 2023-03-17

## 2023-03-16 RX ORDER — ACETAMINOPHEN 325 MG/1
650 TABLET ORAL EVERY 4 HOURS PRN
Status: DISCONTINUED | OUTPATIENT
Start: 2023-03-16 | End: 2023-03-17

## 2023-03-16 RX ORDER — OXYTOCIN/0.9 % SODIUM CHLORIDE 30/500 ML
1-24 PLASTIC BAG, INJECTION (ML) INTRAVENOUS CONTINUOUS
Status: DISCONTINUED | OUTPATIENT
Start: 2023-03-16 | End: 2023-03-17

## 2023-03-16 RX ORDER — CARBOPROST TROMETHAMINE 250 UG/ML
250 INJECTION, SOLUTION INTRAMUSCULAR
Status: DISCONTINUED | OUTPATIENT
Start: 2023-03-16 | End: 2023-03-17

## 2023-03-16 RX ORDER — PROCHLORPERAZINE MALEATE 10 MG
10 TABLET ORAL EVERY 6 HOURS PRN
Status: DISCONTINUED | OUTPATIENT
Start: 2023-03-16 | End: 2023-03-17

## 2023-03-16 RX ORDER — TRANEXAMIC ACID 10 MG/ML
1 INJECTION, SOLUTION INTRAVENOUS EVERY 30 MIN PRN
Status: DISCONTINUED | OUTPATIENT
Start: 2023-03-16 | End: 2023-03-17 | Stop reason: HOSPADM

## 2023-03-16 RX ORDER — CITRIC ACID/SODIUM CITRATE 334-500MG
30 SOLUTION, ORAL ORAL ONCE
Status: COMPLETED | OUTPATIENT
Start: 2023-03-16 | End: 2023-03-16

## 2023-03-16 RX ADMIN — ACETAMINOPHEN 975 MG: 325 TABLET ORAL at 22:29

## 2023-03-16 RX ADMIN — MORPHINE SULFATE 0.15 MG: 1 INJECTION EPIDURAL; INTRATHECAL; INTRAVENOUS at 23:05

## 2023-03-16 RX ADMIN — HYDROXYZINE HYDROCHLORIDE 50 MG: 50 TABLET, FILM COATED ORAL at 21:47

## 2023-03-16 RX ADMIN — SODIUM CHLORIDE, POTASSIUM CHLORIDE, SODIUM LACTATE AND CALCIUM CHLORIDE 500 ML: 600; 310; 30; 20 INJECTION, SOLUTION INTRAVENOUS at 19:14

## 2023-03-16 RX ADMIN — PHENYLEPHRINE HYDROCHLORIDE 75 MCG/MIN: 10 INJECTION INTRAVENOUS at 23:47

## 2023-03-16 RX ADMIN — BUPIVACAINE HYDROCHLORIDE IN DEXTROSE 1.6 ML: 7.5 INJECTION, SOLUTION SUBARACHNOID at 23:05

## 2023-03-16 RX ADMIN — Medication 2 G: at 23:15

## 2023-03-16 RX ADMIN — DINOPROSTONE 10 MG: 10 INSERT VAGINAL at 14:06

## 2023-03-16 RX ADMIN — SODIUM CHLORIDE, POTASSIUM CHLORIDE, SODIUM LACTATE AND CALCIUM CHLORIDE: 600; 310; 30; 20 INJECTION, SOLUTION INTRAVENOUS at 23:05

## 2023-03-16 RX ADMIN — MISOPROSTOL 25 MCG: 100 TABLET ORAL at 20:35

## 2023-03-16 RX ADMIN — FENTANYL CITRATE 15 MCG: 50 INJECTION, SOLUTION INTRAMUSCULAR; INTRAVENOUS at 23:05

## 2023-03-16 RX ADMIN — SODIUM CITRATE AND CITRIC ACID MONOHYDRATE 30 ML: 500; 334 SOLUTION ORAL at 22:29

## 2023-03-16 RX ADMIN — ACETAMINOPHEN 650 MG: 325 TABLET ORAL at 17:23

## 2023-03-16 RX ADMIN — OXYTOCIN-SODIUM CHLORIDE 0.9% IV SOLN 30 UNIT/500ML 600 ML/HR: 30-0.9/5 SOLUTION at 23:58

## 2023-03-16 ASSESSMENT — ACTIVITIES OF DAILY LIVING (ADL)
ADLS_ACUITY_SCORE: 20

## 2023-03-16 NOTE — PROGRESS NOTES
Return OB visit    Subjective:  Patient reports active fetal movement, no vaginal bleeding or leaking fluid. She denies contractions. She noticed increased edema and feels generally unwell today, starting to feel like she's really ready for baby to come and would like to move up her IOL date.      Objective:  BP (!) 142/80   Pulse 71   Wt 96.2 kg (212 lb)   LMP 2022   SpO2 100%   BMI 33.20 kg/m     See OB flow sheet    Assessment and Plan    Christian Nuñez is a 37 year old  at 39w1d here for VINNIE visit, pregnancy complicated by AMA     This visit:  -Discussed that given her initial elevated BP, at a minimum we would recommend pre-e labs and serial Bps to evaluate for pre-e/gHTN and would need to move forward with IOL if diagnostic criteria is met. Given that she is >39 weeks it would also be reasonable to move forward with IOL today regardless of results of pre-e eval and this is what Christian would prefer.   -TC to on call doc and charge RN and L&D staffing ok for IOL even if not medically indicated today so patient will present to L&D for IOL and will do pre-e workup at that time as well.     Dispo: patient to present to L&D     Linda Dalton MD

## 2023-03-16 NOTE — PLAN OF CARE
Pt arrived for IOL for GHTN. Admission assessment completed, PIV initiated. Dr. Kendrick notified of patient arrival. SVE per provider closed/thick/high. Plan to start with cervidil as patient is mercedes too frequently for misoprostol. Cervidil placed at 1406. Patient reports cramping felt in her lower abdomen and side. See flowsheets for uterine activity and FHR records. Mild range Blood pressures noted, patient reports some blurriness of vision and a new onset headache this afternoon. Tylenol administered at 1723, which improved her headache. Continuing with plan of care.

## 2023-03-16 NOTE — H&P
History and Physical     Christian Nuñez MRN# [id number]   YOB: 1985 Age: 37 year old      Date of Admission: 3/16/2023    Primary care provider: Prisca Troncoso      Assessment and Plan:       37 year old  at 39w1d by 9wk US, here for IOL in setting of new gHTN diagnosis. Pregnancy is otherwise uncomplicated.     #gHTN  -serial blood pressures  -HELLP labs sent  - no si/s PreE    # IOL  - ctx 2-3 in 10min, start with cervadil  -augment with robin baloon and pitocin  - C/L/H    # PNC  - Rh positive, Rubella NI (to order PP), GCT neg, GBS neg, Hgb 13.3, Plts 176  - Other prenatal labs wnl  - Imagin% EFW  -  flu, Tdap vaccines  - Contraception: undecided  - Feeding: BF     # FWB:   Cat 1 tracing, moderate variability; accelerations present, cephalic by BSUS; EFW 7.5  - Continuous Fetal Monitoring  - Intrauterine resuscitative measures prn      Patient discussed with Dr. Buchanan          HPI:     Christian Nuñez is a 37 year old  at 39w1d by 9w USw who presents today for IOL.    Pregnancy notable for:   gHTN    OB History:    OB History    Para Term  AB Living   1 0 0 0 0 0   SAB IAB Ectopic Multiple Live Births   0 0 0 0 0      # Outcome Date GA Lbr Radu/2nd Weight Sex Delivery Anes PTL Lv   1 Current                 Prenatal Lab Results:  Lab Results   Component Value Date    AS Negative 2023    HEPBANG Nonreactive 2022    CHPCRT Negative 2022    GCPCRT Negative 2022    HGB 13.3 2023       GBS Status:   No results found for: GBS             Past Medical History:     Past Medical History:   Diagnosis Date     Anxiety      Depressive disorder 10/28/2022     Varicella              Past Surgical History:     Past Surgical History:   Procedure Laterality Date     wisdom tooth removal               Social History:     Social History     Tobacco Use     Smoking status: Never     Smokeless tobacco: Never   Substance Use Topics     Alcohol use: Not  Currently     Comment: 0-4 per week             Family History:     Family History   Problem Relation Age of Onset     No Known Problems Father      No Known Problems Paternal Grandmother      No Known Problems Paternal Grandfather              Immunizations:     Immunization History   Administered Date(s) Administered     COVID-19 Vaccine 12+ (Pfizer) 02/22/2021, 03/15/2021     COVID-19 Vaccine Bivalent Booster 12+ (Pfizer) 10/28/2022     COVID-19,PF,Moderna Booster 12/16/2021     HPV Quadrivalent 05/01/2008, 10/28/2008, 01/29/2009     HepA-Adult 06/19/2001, 07/25/2011     Influenza (IIV3) PF 10/28/2008, 11/25/2009     Influenza Vaccine >6 months (Alfuria,Fluzone) 09/26/2018, 12/20/2019, 10/28/2022     Tdap (Adacel,Boostrix) 06/04/2010, 01/20/2023            Allergies:     Allergies   Allergen Reactions     Blood Transfusion Related (Informational Only) Other (See Comments)     Notified of delay in availability of crossmatched red blood cells due to positive antibody screen      Seasonal Allergies              Medications:     Medications Prior to Admission   Medication Sig Dispense Refill Last Dose     ALPRAZolam (XANAX) 0.5 MG tablet Take 0.5 mg by mouth daily as needed for anxiety   More than a month     amphetamine-dextroamphetamine (ADDERALL XR) 15 MG 24 hr capsule Take 1 capsule (15 mg) by mouth daily 30 capsule 0 More than a month     aspirin (ASA) 81 MG chewable tablet Take 81 mg by mouth daily   Past Month     hydrOXYzine (ATARAX) 25 MG tablet Take 1 tablet (25 mg) by mouth 3 times daily as needed for itching or anxiety 30 tablet 1 More than a month     Prenatal Vit-Fe Fumarate-FA (PRENATAL VITAMIN PO)    Past Month     azithromycin (ZITHROMAX) 250 MG tablet TAKE 2 TABLETS BY MOUTH TODAY, THEN TAKE 1 TABLET DAILY FOR 4 DAYS (Patient not taking: Reported on 3/16/2023)        benzonatate (TESSALON) 100 MG capsule TAKE 2 CAPSULE ORAL AT BEDTIME AS NEEDED SWALLOW CAPSULES WHOLE. (Patient not taking: Reported  "on 3/16/2023)        cyclobenzaprine (FLEXERIL) 10 MG tablet TAKE 1 TABLET BY MOUTH AT BEDTIME IF NEEDED FOR MUSCLE SPASM. (Patient not taking: Reported on 3/16/2023)                Review of Systems & Physical Exam:     The Review of Systems is negative other than noted in the HPI      BP (!) 145/86   Resp 16   Ht 1.702 m (5' 7\")   Wt 96.2 kg (212 lb)   LMP 06/07/2022   BMI 33.20 kg/m    Gen: NAD  CV: RRR, nl S1/S2, no murmurs/clicks/gallops  Lungs: CTAB, non-labored breathing  Abd: Gravid, non-tender, non-distended  Ext: No peripheral extremity edema;      Cervix: c/l/H  Membranes: intact  Presentation: cephalic by BSUS.  Estimated Fetal Weight: 7.5    FHT:  Monitoring External  FHT: Baseline 125 bpm; moderate variability;  accels present; no decelerations  TOCO 1-2 contractions in 10 minutes         Data:         Jose F Kendrick DO, MA  OBGYN-PGY1  "

## 2023-03-16 NOTE — PROGRESS NOTES
"Labor Progress Note- FHT review      O:  /71   Temp 97.8  F (36.6  C) (Oral)   Resp 16   Ht 1.702 m (5' 7\")   Wt 96.2 kg (212 lb)   LMP 2022   BMI 33.20 kg/m       FHT: Baseline 140, moderate variability, accelerations present, no decelerations  TOCO: 2-3contractions in ten minutes    A/P: 37 year old  at 39w1d, here for IOL.    - Labor: Continue cervadil.  - FWB: Category 1    Anticipate     Jose F PAULINO PGY-1  4:59 PM 2023        "

## 2023-03-17 LAB
ALT SERPL W P-5'-P-CCNC: 35 U/L (ref 10–35)
AST SERPL W P-5'-P-CCNC: 29 U/L (ref 10–35)
CREAT SERPL-MCNC: 0.79 MG/DL (ref 0.51–0.95)
GFR SERPL CREATININE-BSD FRML MDRD: >90 ML/MIN/1.73M2
HGB BLD-MCNC: 10.9 G/DL (ref 11.7–15.7)
PLATELET # BLD AUTO: 154 10E3/UL (ref 150–450)
T PALLIDUM AB SER QL: NONREACTIVE

## 2023-03-17 PROCEDURE — 36415 COLL VENOUS BLD VENIPUNCTURE: CPT

## 2023-03-17 PROCEDURE — 250N000011 HC RX IP 250 OP 636

## 2023-03-17 PROCEDURE — 85049 AUTOMATED PLATELET COUNT: CPT

## 2023-03-17 PROCEDURE — 250N000011 HC RX IP 250 OP 636: Performed by: ANESTHESIOLOGY

## 2023-03-17 PROCEDURE — 250N000013 HC RX MED GY IP 250 OP 250 PS 637

## 2023-03-17 PROCEDURE — 250N000009 HC RX 250

## 2023-03-17 PROCEDURE — 85018 HEMOGLOBIN: CPT

## 2023-03-17 PROCEDURE — 84450 TRANSFERASE (AST) (SGOT): CPT

## 2023-03-17 PROCEDURE — 82565 ASSAY OF CREATININE: CPT

## 2023-03-17 PROCEDURE — C9290 INJ, BUPIVACAINE LIPOSOME: HCPCS | Performed by: ANESTHESIOLOGY

## 2023-03-17 PROCEDURE — 84460 ALANINE AMINO (ALT) (SGPT): CPT

## 2023-03-17 PROCEDURE — 258N000003 HC RX IP 258 OP 636

## 2023-03-17 PROCEDURE — 120N000002 HC R&B MED SURG/OB UMMC

## 2023-03-17 RX ORDER — BUPIVACAINE HYDROCHLORIDE 2.5 MG/ML
INJECTION, SOLUTION EPIDURAL; INFILTRATION; INTRACAUDAL
Status: DISCONTINUED | OUTPATIENT
Start: 2023-03-17 | End: 2023-03-17

## 2023-03-17 RX ORDER — AMOXICILLIN 250 MG
2 CAPSULE ORAL 2 TIMES DAILY
Status: DISCONTINUED | OUTPATIENT
Start: 2023-03-17 | End: 2023-03-21 | Stop reason: HOSPADM

## 2023-03-17 RX ORDER — KETOROLAC TROMETHAMINE 30 MG/ML
30 INJECTION, SOLUTION INTRAMUSCULAR; INTRAVENOUS EVERY 6 HOURS
Status: COMPLETED | OUTPATIENT
Start: 2023-03-17 | End: 2023-03-17

## 2023-03-17 RX ORDER — DIPHENHYDRAMINE HCL 25 MG
25 CAPSULE ORAL EVERY 6 HOURS PRN
Status: DISCONTINUED | OUTPATIENT
Start: 2023-03-17 | End: 2023-03-21 | Stop reason: HOSPADM

## 2023-03-17 RX ORDER — PROCHLORPERAZINE MALEATE 10 MG
10 TABLET ORAL EVERY 6 HOURS PRN
Status: DISCONTINUED | OUTPATIENT
Start: 2023-03-17 | End: 2023-03-21 | Stop reason: HOSPADM

## 2023-03-17 RX ORDER — PROCHLORPERAZINE 25 MG
25 SUPPOSITORY, RECTAL RECTAL EVERY 12 HOURS PRN
Status: DISCONTINUED | OUTPATIENT
Start: 2023-03-17 | End: 2023-03-21 | Stop reason: HOSPADM

## 2023-03-17 RX ORDER — METOCLOPRAMIDE 10 MG/1
10 TABLET ORAL EVERY 6 HOURS PRN
Status: DISCONTINUED | OUTPATIENT
Start: 2023-03-17 | End: 2023-03-21 | Stop reason: HOSPADM

## 2023-03-17 RX ORDER — OXYTOCIN/0.9 % SODIUM CHLORIDE 30/500 ML
100 PLASTIC BAG, INJECTION (ML) INTRAVENOUS CONTINUOUS
Status: DISCONTINUED | OUTPATIENT
Start: 2023-03-17 | End: 2023-03-21 | Stop reason: HOSPADM

## 2023-03-17 RX ORDER — MISOPROSTOL 200 UG/1
800 TABLET ORAL
Status: DISCONTINUED | OUTPATIENT
Start: 2023-03-17 | End: 2023-03-21 | Stop reason: HOSPADM

## 2023-03-17 RX ORDER — MODIFIED LANOLIN
OINTMENT (GRAM) TOPICAL
Status: DISCONTINUED | OUTPATIENT
Start: 2023-03-17 | End: 2023-03-21 | Stop reason: HOSPADM

## 2023-03-17 RX ORDER — BISACODYL 10 MG
10 SUPPOSITORY, RECTAL RECTAL DAILY PRN
Status: DISCONTINUED | OUTPATIENT
Start: 2023-03-19 | End: 2023-03-21 | Stop reason: HOSPADM

## 2023-03-17 RX ORDER — HYDROCORTISONE 25 MG/G
CREAM TOPICAL 3 TIMES DAILY PRN
Status: DISCONTINUED | OUTPATIENT
Start: 2023-03-17 | End: 2023-03-21 | Stop reason: HOSPADM

## 2023-03-17 RX ORDER — ONDANSETRON 2 MG/ML
INJECTION INTRAMUSCULAR; INTRAVENOUS PRN
Status: DISCONTINUED | OUTPATIENT
Start: 2023-03-17 | End: 2023-03-17

## 2023-03-17 RX ORDER — METOCLOPRAMIDE HYDROCHLORIDE 5 MG/ML
10 INJECTION INTRAMUSCULAR; INTRAVENOUS EVERY 6 HOURS PRN
Status: DISCONTINUED | OUTPATIENT
Start: 2023-03-17 | End: 2023-03-21 | Stop reason: HOSPADM

## 2023-03-17 RX ORDER — MISOPROSTOL 200 UG/1
400 TABLET ORAL
Status: DISCONTINUED | OUTPATIENT
Start: 2023-03-17 | End: 2023-03-21 | Stop reason: HOSPADM

## 2023-03-17 RX ORDER — KETOROLAC TROMETHAMINE 30 MG/ML
INJECTION, SOLUTION INTRAMUSCULAR; INTRAVENOUS PRN
Status: DISCONTINUED | OUTPATIENT
Start: 2023-03-17 | End: 2023-03-17

## 2023-03-17 RX ORDER — DEXTROSE, SODIUM CHLORIDE, SODIUM LACTATE, POTASSIUM CHLORIDE, AND CALCIUM CHLORIDE 5; .6; .31; .03; .02 G/100ML; G/100ML; G/100ML; G/100ML; G/100ML
INJECTION, SOLUTION INTRAVENOUS CONTINUOUS
Status: DISCONTINUED | OUTPATIENT
Start: 2023-03-17 | End: 2023-03-21 | Stop reason: HOSPADM

## 2023-03-17 RX ORDER — CARBOPROST TROMETHAMINE 250 UG/ML
250 INJECTION, SOLUTION INTRAMUSCULAR
Status: DISCONTINUED | OUTPATIENT
Start: 2023-03-17 | End: 2023-03-21 | Stop reason: HOSPADM

## 2023-03-17 RX ORDER — DIPHENHYDRAMINE HYDROCHLORIDE 50 MG/ML
25 INJECTION INTRAMUSCULAR; INTRAVENOUS EVERY 6 HOURS PRN
Status: DISCONTINUED | OUTPATIENT
Start: 2023-03-17 | End: 2023-03-21 | Stop reason: HOSPADM

## 2023-03-17 RX ORDER — LIDOCAINE 40 MG/G
CREAM TOPICAL
Status: DISCONTINUED | OUTPATIENT
Start: 2023-03-17 | End: 2023-03-21 | Stop reason: HOSPADM

## 2023-03-17 RX ORDER — METHYLERGONOVINE MALEATE 0.2 MG/ML
200 INJECTION INTRAVENOUS
Status: DISCONTINUED | OUTPATIENT
Start: 2023-03-17 | End: 2023-03-21 | Stop reason: HOSPADM

## 2023-03-17 RX ORDER — NALBUPHINE HYDROCHLORIDE 10 MG/ML
2.5-5 INJECTION, SOLUTION INTRAMUSCULAR; INTRAVENOUS; SUBCUTANEOUS EVERY 4 HOURS PRN
Status: DISCONTINUED | OUTPATIENT
Start: 2023-03-17 | End: 2023-03-21 | Stop reason: HOSPADM

## 2023-03-17 RX ORDER — OXYCODONE HYDROCHLORIDE 5 MG/1
5 TABLET ORAL EVERY 4 HOURS PRN
Status: DISCONTINUED | OUTPATIENT
Start: 2023-03-17 | End: 2023-03-21 | Stop reason: HOSPADM

## 2023-03-17 RX ORDER — ONDANSETRON 4 MG/1
4 TABLET, ORALLY DISINTEGRATING ORAL EVERY 6 HOURS PRN
Status: DISCONTINUED | OUTPATIENT
Start: 2023-03-17 | End: 2023-03-21 | Stop reason: HOSPADM

## 2023-03-17 RX ORDER — ACETAMINOPHEN 325 MG/1
975 TABLET ORAL EVERY 6 HOURS
Status: DISCONTINUED | OUTPATIENT
Start: 2023-03-17 | End: 2023-03-21 | Stop reason: HOSPADM

## 2023-03-17 RX ORDER — AMOXICILLIN 250 MG
1 CAPSULE ORAL 2 TIMES DAILY
Status: DISCONTINUED | OUTPATIENT
Start: 2023-03-17 | End: 2023-03-21 | Stop reason: HOSPADM

## 2023-03-17 RX ORDER — ONDANSETRON 2 MG/ML
4 INJECTION INTRAMUSCULAR; INTRAVENOUS EVERY 6 HOURS PRN
Status: DISCONTINUED | OUTPATIENT
Start: 2023-03-17 | End: 2023-03-21 | Stop reason: HOSPADM

## 2023-03-17 RX ORDER — TRANEXAMIC ACID 10 MG/ML
1 INJECTION, SOLUTION INTRAVENOUS EVERY 30 MIN PRN
Status: DISCONTINUED | OUTPATIENT
Start: 2023-03-17 | End: 2023-03-21 | Stop reason: HOSPADM

## 2023-03-17 RX ORDER — SIMETHICONE 80 MG
80 TABLET,CHEWABLE ORAL 4 TIMES DAILY PRN
Status: DISCONTINUED | OUTPATIENT
Start: 2023-03-17 | End: 2023-03-21 | Stop reason: HOSPADM

## 2023-03-17 RX ORDER — OXYTOCIN/0.9 % SODIUM CHLORIDE 30/500 ML
340 PLASTIC BAG, INJECTION (ML) INTRAVENOUS CONTINUOUS PRN
Status: DISCONTINUED | OUTPATIENT
Start: 2023-03-17 | End: 2023-03-21 | Stop reason: HOSPADM

## 2023-03-17 RX ORDER — IBUPROFEN 800 MG/1
800 TABLET, FILM COATED ORAL EVERY 6 HOURS
Status: DISCONTINUED | OUTPATIENT
Start: 2023-03-18 | End: 2023-03-21 | Stop reason: HOSPADM

## 2023-03-17 RX ORDER — OXYTOCIN 10 [USP'U]/ML
10 INJECTION, SOLUTION INTRAMUSCULAR; INTRAVENOUS
Status: DISCONTINUED | OUTPATIENT
Start: 2023-03-17 | End: 2023-03-21 | Stop reason: HOSPADM

## 2023-03-17 RX ADMIN — PHENYLEPHRINE HYDROCHLORIDE 150 MCG: 10 INJECTION INTRAVENOUS at 00:01

## 2023-03-17 RX ADMIN — KETOROLAC TROMETHAMINE 30 MG: 30 INJECTION, SOLUTION INTRAMUSCULAR; INTRAVENOUS at 12:59

## 2023-03-17 RX ADMIN — BUPIVACAINE 20 ML: 13.3 INJECTION, SUSPENSION, LIPOSOMAL INFILTRATION at 02:20

## 2023-03-17 RX ADMIN — SENNOSIDES AND DOCUSATE SODIUM 1 TABLET: 50; 8.6 TABLET ORAL at 20:02

## 2023-03-17 RX ADMIN — ACETAMINOPHEN 975 MG: 325 TABLET ORAL at 04:12

## 2023-03-17 RX ADMIN — BUPIVACAINE HYDROCHLORIDE 20 ML: 2.5 INJECTION, SOLUTION EPIDURAL; INFILTRATION; INTRACAUDAL at 02:20

## 2023-03-17 RX ADMIN — SENNOSIDES AND DOCUSATE SODIUM 2 TABLET: 50; 8.6 TABLET ORAL at 08:56

## 2023-03-17 RX ADMIN — KETOROLAC TROMETHAMINE 30 MG: 30 INJECTION, SOLUTION INTRAMUSCULAR; INTRAVENOUS at 18:47

## 2023-03-17 RX ADMIN — KETOROLAC TROMETHAMINE 30 MG: 30 INJECTION, SOLUTION INTRAMUSCULAR at 00:45

## 2023-03-17 RX ADMIN — ACETAMINOPHEN 975 MG: 325 TABLET ORAL at 17:10

## 2023-03-17 RX ADMIN — NALBUPHINE HYDROCHLORIDE 2.5 MG: 10 INJECTION, SOLUTION INTRAMUSCULAR; INTRAVENOUS; SUBCUTANEOUS at 08:55

## 2023-03-17 RX ADMIN — ACETAMINOPHEN 975 MG: 325 TABLET ORAL at 23:20

## 2023-03-17 RX ADMIN — TRANEXAMIC ACID 1 G: 1 INJECTION, SOLUTION INTRAVENOUS at 00:19

## 2023-03-17 RX ADMIN — KETOROLAC TROMETHAMINE 30 MG: 30 INJECTION, SOLUTION INTRAMUSCULAR; INTRAVENOUS at 06:51

## 2023-03-17 RX ADMIN — ACETAMINOPHEN 975 MG: 325 TABLET ORAL at 10:57

## 2023-03-17 RX ADMIN — NALBUPHINE HYDROCHLORIDE 5 MG: 10 INJECTION, SOLUTION INTRAMUSCULAR; INTRAVENOUS; SUBCUTANEOUS at 17:37

## 2023-03-17 RX ADMIN — SODIUM CHLORIDE, POTASSIUM CHLORIDE, SODIUM LACTATE AND CALCIUM CHLORIDE: 600; 310; 30; 20 INJECTION, SOLUTION INTRAVENOUS at 00:11

## 2023-03-17 RX ADMIN — ONDANSETRON 4 MG: 2 INJECTION INTRAMUSCULAR; INTRAVENOUS at 00:33

## 2023-03-17 ASSESSMENT — ACTIVITIES OF DAILY LIVING (ADL)
ADLS_ACUITY_SCORE: 22
ADLS_ACUITY_SCORE: 20
ADLS_ACUITY_SCORE: 25
ADLS_ACUITY_SCORE: 22
ADLS_ACUITY_SCORE: 22
ADLS_ACUITY_SCORE: 20
ADLS_ACUITY_SCORE: 25
ADLS_ACUITY_SCORE: 22
ADLS_ACUITY_SCORE: 20
ADLS_ACUITY_SCORE: 20

## 2023-03-17 NOTE — PROGRESS NOTES
[facility] PROGRESS NOTE:   March 16, 2023 9:56 PM         SUBJECTIVE:   Patient feeling few mild contractions.         OBJECTIVE:     Vitals:    03/16/23 1149 03/16/23 1450 03/16/23 1932 03/16/23 2038   BP: (!) 145/86 137/71 134/65 124/72   BP Location:   Left arm    Patient Position:   Other (comments)    Cuff Size:   Adult Regular    Resp: 16 16 16    Temp:  97.8  F (36.6  C) 97.8  F (36.6  C) 98.4  F (36.9  C)   TempSrc:  Oral Oral Oral   SpO2:   98% 99%   Weight:       Height:           NST:  Fetal Heart Rate Tracing: fetal heart rate was in the middle of a decel when patient returned from the bathroom at 2130 with aleta at 70bpm last 4 minutes that we have on the monitor. Patient was off the monitor for 11 minutes prior to unknown when decel started. Prior to time off the monitor fetal heart rate was 140bpm baseline with moderate variability, accels present and no decels. After the decel fetal heart rate recovered to 140bpm baseline with minimal variability until 2154 then moderate variability and accels resumed. No further decels.     Tocometer: Uterine irritability    Gen: alert, oriented, no distress  Abdomen:  Gravid, nontender  Cervix: not rechecked, was closed/30/-4 at 2000.         LABS:     Recent Results (from the past 12 hour(s))   CBC with platelets    Collection Time: 03/16/23 12:08 PM   Result Value Ref Range    WBC Count 8.7 4.0 - 11.0 10e3/uL    RBC Count 4.01 3.80 - 5.20 10e6/uL    Hemoglobin 13.3 11.7 - 15.7 g/dL    Hematocrit 38.4 35.0 - 47.0 %    MCV 96 78 - 100 fL    MCH 33.2 (H) 26.5 - 33.0 pg    MCHC 34.6 31.5 - 36.5 g/dL    RDW 12.7 10.0 - 15.0 %    Platelet Count 176 150 - 450 10e3/uL   Creatinine    Collection Time: 03/16/23 12:08 PM   Result Value Ref Range    Creatinine 0.70 0.51 - 0.95 mg/dL    GFR Estimate >90 >60 mL/min/1.73m2   ALT    Collection Time: 03/16/23 12:08 PM   Result Value Ref Range    ALT 51 (H) 10 - 35 U/L   AST    Collection Time: 03/16/23 12:08 PM    Result Value Ref Range    AST 27 10 - 35 U/L   Adult Type and Screen    Collection Time: 23 12:08 PM   Result Value Ref Range    ABO/RH(D) A POS     Antibody Screen Negative Negative    SPECIMEN EXPIRATION DATE [id number]    Protein  random urine    Collection Time: 23 12:09 PM   Result Value Ref Range    Total Protein Urine mg/dL <4.0 1.0 - 14.0 mg/dL    Total Protein UR MG/MG CR      Creatinine Urine mg/dL 25.3 mg/dL   Asymptomatic COVID-19 Virus (Coronavirus) by PCR Nose    Collection Time: 23  2:49 PM    Specimen: Nose; Swab   Result Value Ref Range    SARS CoV2 PCR Negative Negative              ASSESSMENT / PLAN:   37 year old  at 39w1d admitted for IOL for gestational htn. Category 2 tracing remote from delivery with prolonged decelerations when patient is upright. Discussed concern for cord compression with gravity when fetus moves down when she is upright. Discussed recurrent prolonged decelerations remote from delivery. Recommend delivery by  section. Discussed it is ideal to avoid a situation when we may need to do an emergency  section. Patient expresses agreement and desires to proceed with delivery by  section. Patient previously consented, reviewed procedure and recovery. Questions answered. Fetal tracing currently category 1 so case is non-urgent, there is currently a  section in the OR, will plan to go when that is finished as long as category 1 tracing continues. Charge nurse and anesthesia team notified.    Aracelis Buchanan MD

## 2023-03-17 NOTE — DISCHARGE SUMMARY
[facility] Discharge Summary    Christian Nuñez MRN# [id number]   Age: 37 year old YOB: 1985     Date of Admission:  3/16/2023  Date of Discharge:  3/19/2023  Admitting Physician:  Aracelis Buchanan MD  Discharge Physician:  Isatu Baugh MD    Admit Dx:   - , Intrauterine pregnancy at 39w1d  - Gestational hypertension  - Advanced maternal age   - Rubella nonimmune    Discharge Dx:  - Same as above, now  s/p below stated procedures  - Mild ALT elevation, resolved  - Cat 2 RFD    Procedures:  - Primary low transverse  section with two layer uterine closure via Pfannenstiel incision  - Spinal anesthesia     Consults:  - Anesthesia    Admit HPI:  Christian Nuñez is a 37 year old  at 39w1d by 9w  admitted for IOL for gHTN.    Please see her admit H&P for full details of her PMH, PSH, Meds, Allergies and exam on admit.    Intrapartum Course:   Christian Nuñez is a 37 year old  at 39w1d admitted for IOL in the setting of newly diagnosed gestational hypertension.  Cervix closed on presentation but patient mercedes frequently, so induction began with Cervidil, which was removed in the setting of a prolonged deceleration.  Switched to PO misoprostol for cervical ripening but with recurrence of prolonged decelerations, ultimately warranting urgent  delivery.  The risks, benefits, and alternatives of  section were discussed with the patient, and she agreed to proceed.     Operative Course:  Surgery was uncomplicated. EBL from the delivery was 288cc. Please see her  Section Operative Note for full details regarding her delivery.    Operative Findings:   1. No adhesions.  2. Clear amniotic fluid.  3. Liveborn male infant in ROT presentation. Apgars 9 at 1 minute & 9 at 5 minutes. Weight 3.062 kg.  4. Small hematoma approximately 1 x 2 cm in size noted on the inferior/right aspect of the hysterotomy, not expanding in  size at the time of closure.   5. Subserosal fibroid (presumed FIGO Grade 6) about 2 cm in size seen on the anterior mid uterus, with tissue noted to be friable. Hemostasis achieved with electrocautery.  6. SURGIFLO placed over the above noted fibroid and hysterotomy.  7. Otherwise normal uterus, fallopian tubes, and ovaries.     Postoperative Course:  In the setting of gHTN, ALT mildly elevated to 51, trended to 35 without any signs/symptoms of severe features of preeclampsia. BP controlled without antihypertensives. At time of discharge, her blood pressures were in the normal range. Her postoperative course was otherwise uncomplicated. On POD#3, she was meeting all of her postpartum goals and deemed stable for discharge. She was voiding without difficulty, tolerating a regular diet without nausea and vomiting, her pain was well controlled on oral pain medicines, and her lochia was appropriate. Her hemoglobin prior to delivery was 13.3 and after delivery was 10.9. Her Rh status was positive and Rhogam was not indicated.    Discharge Medications:     Review of your medicines      UNREVIEWED medicines. Ask your doctor about these medicines      Dose / Directions   ALPRAZolam 0.5 MG tablet  Commonly known as: XANAX      Dose: 0.5 mg  Take 0.5 mg by mouth daily as needed for anxiety  Refills: 0     amphetamine-dextroamphetamine 15 MG 24 hr capsule  Commonly known as: ADDERALL XR  Used for: Attention deficit hyperactivity disorder (ADHD), unspecified ADHD type      Dose: 15 mg  Take 1 capsule (15 mg) by mouth daily  Quantity: 30 capsule  Refills: 0     aspirin 81 MG chewable tablet  Commonly known as: ASA      Dose: 81 mg  Take 81 mg by mouth daily  Refills: 0     azithromycin 250 MG tablet  Commonly known as: ZITHROMAX      TAKE 2 TABLETS BY MOUTH TODAY, THEN TAKE 1 TABLET DAILY FOR 4 DAYS  Refills: 0     benzonatate 100 MG capsule  Commonly known as: TESSALON      TAKE 2 CAPSULE ORAL AT BEDTIME AS NEEDED SWALLOW  CAPSULES WHOLE.  Refills: 0     cyclobenzaprine 10 MG tablet  Commonly known as: FLEXERIL      TAKE 1 TABLET BY MOUTH AT BEDTIME IF NEEDED FOR MUSCLE SPASM.  Refills: 0     hydrOXYzine 25 MG tablet  Commonly known as: ATARAX  Used for: Anxiety      Dose: 25 mg  Take 1 tablet (25 mg) by mouth 3 times daily as needed for itching or anxiety  Quantity: 30 tablet  Refills: 1     PRENATAL VITAMIN PO      Refills: 0          Discharge/Disposition:  Christian Nuñez was discharged to home in stable condition with the following instructions/medications:  1) Call for temperature > 100.4, bright red vaginal bleeding >1 pad an hour x 2 hours, foul smelling vaginal discharge, pain not controlled by usual oral pain meds, persistent nausea and vomiting not controlled on medications, drainage or redness from incision site  2) For feeding she decided to breast feed.  3) She was instructed to follow-up with her primary OB within 1 week for a BP check and in 6 weeks for a routine postpartum visit.  4) Discharge activity:  No heavy lifting >15 lbs or strenuous activity for 6 weeks, pelvic rest for 6 weeks, no driving or operating machinery while on narcotics.    Aracelis Brandon MD  Ob/Gyn PGY-1  03/19/23 8:12 AM

## 2023-03-17 NOTE — ANESTHESIA PREPROCEDURE EVALUATION
Anesthesia Pre-Procedure Evaluation    Patient: Christian Nuñez   MRN: [id number] : 1985        Procedure :           Past Medical History:   Diagnosis Date     Anxiety      Depressive disorder 10/28/2022     Varicella       Past Surgical History:   Procedure Laterality Date     wisdom tooth removal        Allergies   Allergen Reactions     Blood Transfusion Related (Informational Only) Other (See Comments)     Notified of delay in availability of crossmatched red blood cells due to positive antibody screen 22; negative antibody screeen 3/16/2023     Seasonal Allergies       Social History     Tobacco Use     Smoking status: Never     Smokeless tobacco: Never   Substance Use Topics     Alcohol use: Not Currently     Comment: 0-4 per week      Wt Readings from Last 1 Encounters:   23 96.2 kg (212 lb)        Anesthesia Evaluation            ROS/MED HX  ENT/Pulmonary:       Neurologic:       Cardiovascular:       METS/Exercise Tolerance:     Hematologic:       Musculoskeletal:       GI/Hepatic:       Renal/Genitourinary:       Endo:       Psychiatric/Substance Use:     (+) psychiatric history anxiety and depression     Infectious Disease:       Malignancy:       Other:            Physical Exam    Airway        Mallampati: II   TM distance: > 3 FB   Neck ROM: full   Mouth opening: > 3 cm    Respiratory Devices and Support         Dental  no notable dental history         Cardiovascular   cardiovascular exam normal          Pulmonary   pulmonary exam normal                OUTSIDE LABS:  CBC:   Lab Results   Component Value Date    WBC 8.7 2023    WBC 7.9 2022    HGB 13.3 2023    HGB 12.5 2023    HCT 38.4 2023    HCT 38.6 2022     2023     2022     BMP:   Lab Results   Component Value Date    CR 0.70 2023     COAGS: No results found for: PTT, INR, FIBR  POC: No results found for: BGM, HCG, HCGS  HEPATIC:   Lab Results   Component Value  Date    ALT 51 (H) 03/16/2023    AST 27 03/16/2023     OTHER: No results found for: PH, LACT, A1C, KENISHA, PHOS, MAG, LIPASE, AMYLASE, TSH, T4, T3, CRP, SED    Anesthesia Plan    ASA Status:  2, emergent       Anesthesia Type: Spinal.              Consents    Anesthesia Plan(s) and associated risks, benefits, and realistic alternatives discussed. Questions answered and patient/representative(s) expressed understanding.    - Discussed:     - Discussed with:  Patient         Postoperative Care            Comments:                Shabbir Burt MD

## 2023-03-17 NOTE — PLAN OF CARE
OR to PACU Transfer Note  Data: Pt to OB PACU at 0048 via cart. PIV infusing LR with oxytocin without complications, robin with yessica color urine to gravity, temp 97.9, /71, HR 70, pt does not complain of pain and/or nausea.   Interventions: IV to pump, monitors and alarms on,  SCD on.  Response: stable  Plan: Patient instructed to notify RN for pain or nausea, routine post op cares, initiate breastfeeding/pumping as soon as patient/infant able.

## 2023-03-17 NOTE — PROGRESS NOTES
"   [facility] PROGRESS NOTE:   2023 7:24 PM         SUBJECTIVE:   Called to the room for a decel  Patient complains of mild contractions. No leaking or bleeding         OBJECTIVE:     Vitals:    23 1134 23 1141 23 1149 23 1450   BP: (!) 149/90  (!) 145/86 137/71   Resp:    Temp:    97.8  F (36.6  C)   TempSrc:    Oral   Weight:  96.2 kg (212 lb)     Height:  1.702 m (5' 7\")           NST:  Fetal Heart Rate Tracin bpm baseline, mod variability, + accels, possible isolated variable decels at 1538 and 1808, then  prolonged decel at 1900 for 9 min down to aleta of 70bpm that resolved with position changes. Fetal heart rate back up to 140s on hands and knees. IV fluid bolus started.     Cervidil removed while patient on hands and knees, cervix not checked           LABS:     Recent Results (from the past 12 hour(s))   CBC with platelets    Collection Time: 23 12:08 PM   Result Value Ref Range    WBC Count 8.7 4.0 - 11.0 10e3/uL    RBC Count 4.01 3.80 - 5.20 10e6/uL    Hemoglobin 13.3 11.7 - 15.7 g/dL    Hematocrit 38.4 35.0 - 47.0 %    MCV 96 78 - 100 fL    MCH 33.2 (H) 26.5 - 33.0 pg    MCHC 34.6 31.5 - 36.5 g/dL    RDW 12.7 10.0 - 15.0 %    Platelet Count 176 150 - 450 10e3/uL   Creatinine    Collection Time: 23 12:08 PM   Result Value Ref Range    Creatinine 0.70 0.51 - 0.95 mg/dL    GFR Estimate >90 >60 mL/min/1.73m2   ALT    Collection Time: 23 12:08 PM   Result Value Ref Range    ALT 51 (H) 10 - 35 U/L   AST    Collection Time: 23 12:08 PM   Result Value Ref Range    AST 27 10 - 35 U/L   Adult Type and Screen    Collection Time: 23 12:08 PM   Result Value Ref Range    ABO/RH(D) A POS     Antibody Screen Negative Negative    SPECIMEN EXPIRATION DATE [id number]    Protein  random urine    Collection Time: 23 12:09 PM   Result Value Ref Range    Total Protein Urine mg/dL <4.0 1.0 - 14.0 mg/dL    Total Protein UR " MG/MG CR      Creatinine Urine mg/dL 25.3 mg/dL   Asymptomatic COVID-19 Virus (Coronavirus) by PCR Nose    Collection Time: 23  2:49 PM    Specimen: Nose; Swab   Result Value Ref Range    SARS CoV2 PCR Negative Negative              ASSESSMENT / PLAN:   37 year old  at 39w1d admitted for IOL for gestational htn.    Labor: s/p cervidil for ripening, removed early due to decel  Fetal well being: Category 2 tracing due to prolonged decel. Improved with position changes. IV fluid bolus started. Cervidil removed. Patient now on her side and FHR in 140s. Discussed  section indications, procedure, risks and recovery. Risks include but are not limited to infection that may need antibiotics to treat, injury to other organs such as but not limited to bladder, ureters, intestines, injury to baby, risk of excessive blood loss, potential need for blood transfusion and potential need for hysterectomy. Patient consents to transfusion of blood products if indicated. Discussed risks of anesthesia and DVT. Verbal and written consent obtained.      Plan to monitor fetus closely and check cervix in 30min, then will discuss plan for delivery - continue IOL versus proceed with delivery by CS.       Aracelis Buchanan MD

## 2023-03-17 NOTE — PLAN OF CARE
Data: Christian Nuñez transferred to [id number] via PACU cart at 0300. Baby transferred via parent's arms.  Action: Receiving unit notified of transfer: Yes. Patient and family notified of room change. Report given to Aileen at 0239. Belongings sent to receiving unit. Accompanied by Registered Nurse. Oriented patient to surroundings. Call light within reach. ID bands double-checked with receiving RN.  Response: Patient tolerated transfer and is stable.

## 2023-03-17 NOTE — PLAN OF CARE
Problem: Postpartum ( Delivery)  Goal: Successful Maternal Role Transition  Outcome: Progressing  Goal: Hemostasis  Outcome: Progressing  Goal: Fluid and Electrolyte Balance  Outcome: Progressing  Goal: Absence of Infection Signs and Symptoms  Outcome: Progressing  Goal: Optimal Pain Control and Function  Outcome: Progressing  Intervention: Prevent or Manage Pain  Recent Flowsheet Documentation  Taken 3/17/2023 1057 by Melissa Brown RN  Pain Management Interventions: medication (see MAR)  Goal: Effective Urinary Elimination  Outcome: Progressing  Goal: Effective Oxygenation and Ventilation  Outcome: Progressing  Intervention: Optimize Oxygenation and Ventilation  Recent Flowsheet Documentation  Taken 3/17/2023 1500 by Melissa Brown, RN  Head of Bed (HOB) Positioning: HOB at 30-45 degrees  Taken 3/17/2023 1057 by Melissa Brown RN  Head of Bed (HOB) Positioning: HOB at 30-45 degrees    Assessment complete and WDL. VSS. Pt is up ad shabbir, voiding WDL and reports no passing of gas yet. Pt is taking toradol and tylenol for pain. Bleeding is scant, fundus is firm and midline. Pt is taking nubain for itching, extra doses are in patient bin in med room if she needs more. Pt is breastfeeding infant with setup and support from nurse staff. Lactation consult released.     Patient bonding well with infant. Spouse at bedside and very supportive of cares for mom and baby. Continue POC.

## 2023-03-17 NOTE — ANESTHESIA POSTPROCEDURE EVALUATION
Patient: Christian Nuñez    Procedure: Procedure(s):   section       Anesthesia Type:  Spinal    Note:     Postop Pain Control: Uneventful            Sign Out: Well controlled pain   PONV: No   Neuro/Psych: Uneventful            Sign Out: Acceptable/Baseline neuro status   Airway/Respiratory: Uneventful            Sign Out: Acceptable/Baseline resp. status   CV/Hemodynamics: Uneventful            Sign Out: Acceptable CV status; No obvious hypovolemia; No obvious fluid overload   Other NRE: NONE   DID A NON-ROUTINE EVENT OCCUR? No           Last vitals:  Vitals:    23 0110 23 0123 23 0125   BP: 118/71 108/65 119/70   Pulse: 68 72 70   Resp:    Temp:      SpO2: 97% 98% 98%       Electronically Signed By: Augustus Mari DO  2023  1:46 AM

## 2023-03-17 NOTE — PLAN OF CARE
Prolonged decel noted at 1900. Patient sitting straight up in bed, head lowered, then positioned to her left side, then hands and knees. Dr. Buchanan at bedside at 190, cervidil removed at 190. IV fluid bolus started at 191. Pt educated on deceleration and questions answered, she verbalizes understanding. Consent signed for  with the plan to continue to monitor FHR for the next 30 minutes then reassess cervix. Bedside report given to FREDDY Santos at 192.

## 2023-03-17 NOTE — PROGRESS NOTES
"   [facility] PROGRESS NOTE:   March 16, 2023 8:10 PM         SUBJECTIVE:   Patient reports rare contractions.         OBJECTIVE:     Vitals:    03/16/23 1141 03/16/23 1149 03/16/23 1450 03/16/23 1932   BP:  (!) 145/86 137/71 134/65   Resp:  16 16 16   Temp:   97.8  F (36.6  C)    TempSrc:   Oral    SpO2:    98%   Weight: 96.2 kg (212 lb)      Height: 1.702 m (5' 7\")            NST:  Fetal Heart Rate Tracing: following decel, baseline 145bpm with minimal variability, moderate variability started at 1934 with accels, no decels.     Tocometer: Uterine irritability    Gen: alert, oriented, no distress  Abdomen:  Gravid, nontender  Cervix:   Dilation: closed   Effacement: 30%   Station:-4   Consistency: average   Position: Posterior         LABS:     Recent Results (from the past 12 hour(s))   CBC with platelets    Collection Time: 03/16/23 12:08 PM   Result Value Ref Range    WBC Count 8.7 4.0 - 11.0 10e3/uL    RBC Count 4.01 3.80 - 5.20 10e6/uL    Hemoglobin 13.3 11.7 - 15.7 g/dL    Hematocrit 38.4 35.0 - 47.0 %    MCV 96 78 - 100 fL    MCH 33.2 (H) 26.5 - 33.0 pg    MCHC 34.6 31.5 - 36.5 g/dL    RDW 12.7 10.0 - 15.0 %    Platelet Count 176 150 - 450 10e3/uL   Creatinine    Collection Time: 03/16/23 12:08 PM   Result Value Ref Range    Creatinine 0.70 0.51 - 0.95 mg/dL    GFR Estimate >90 >60 mL/min/1.73m2   ALT    Collection Time: 03/16/23 12:08 PM   Result Value Ref Range    ALT 51 (H) 10 - 35 U/L   AST    Collection Time: 03/16/23 12:08 PM   Result Value Ref Range    AST 27 10 - 35 U/L   Adult Type and Screen    Collection Time: 03/16/23 12:08 PM   Result Value Ref Range    ABO/RH(D) A POS     Antibody Screen Negative Negative    SPECIMEN EXPIRATION DATE [id number]    Protein  random urine    Collection Time: 03/16/23 12:09 PM   Result Value Ref Range    Total Protein Urine mg/dL <4.0 1.0 - 14.0 mg/dL    Total Protein UR MG/MG CR      Creatinine Urine mg/dL 25.3 mg/dL   Asymptomatic COVID-19 Virus " (Coronavirus) by PCR Nose    Collection Time: 23  2:49 PM    Specimen: Nose; Swab   Result Value Ref Range    SARS CoV2 PCR Negative Negative              ASSESSMENT / PLAN:   37 year old  at 39w1d admitted for IOL for gestational htn. Category 2 tracing due to prolonged decel resolved with maternal position change. Cervix unfavorable. Discussed option of continuing IOL versus delivery by  section. Fetal heart rate is now cat 1 and reactive. Discussed it would be safe to continue cervical ripening but we will continue monitoring baby very closely. Did not like cervidil bc of pain. Plan PO miso.       Aracelis Buchanan MD

## 2023-03-17 NOTE — PLAN OF CARE
Goal Outcome Evaluation:    Postpartum assessments WDL. VSS. Pain managed with scheduled Tylenol and Ibuprofen. Fundus firm, midline and below the U. Light-Scant amounts of lochia with no clots or odor present. Patient complained of some itchiness, administered nubain. Patient has not been OOB since arriving to unit around 0300. Patient very tired and wanted to sleep awhile before attempting to ambulate. Breastfeeding with assistance Q2-3H, lactation released. Infant and mother showing positive signs of attachment. Significant other present at bedside, involved with cares. Call light within reach. Continue with plan of care.    Aileen Restrepo RN

## 2023-03-17 NOTE — ADDENDUM NOTE
Addendum  created 03/17/23 0235 by Shabbir Burt MD    Child order released for a procedure order, Clinical Note Signed, Intraprocedure Blocks edited, SmartForm saved

## 2023-03-17 NOTE — PROGRESS NOTES
"OB Postpartum Progress Note  POD#1 s/p PLTCS for Cat 2 RFD    S: Feeling alright this morning. Pain controlled. Tolerating water and crackers without nausea or emesis. Larsen in place. Passing some flatus. Not yet ambulating but without dizziness or lightheadedness at rest. Planning on breast feeding. Lochia light.    O:  Patient Vitals for the past 24 hrs:   BP Temp Temp src Pulse Resp SpO2 Height Weight   03/17/23 0555 112/67 98.1  F (36.7  C) Oral 71 16 99 % -- --   03/17/23 0445 129/75 98  F (36.7  C) Oral 65 16 99 % -- --   03/17/23 0411 114/81 98.4  F (36.9  C) Oral 69 16 98 % -- --   03/17/23 0308 128/82 98.2  F (36.8  C) Oral 71 16 99 % -- --   03/17/23 0247 -- 97.8  F (36.6  C) -- 67 17 100 % -- --   03/17/23 0240 114/81 -- -- 73 16 100 % -- --   03/17/23 0200 -- -- -- 69 15 99 % -- --   03/17/23 0155 106/63 -- -- 64 15 98 % -- --   03/17/23 0140 108/69 -- -- 67 18 99 % -- --   03/17/23 0130 115/71 -- -- 69 14 98 % -- --   03/17/23 0125 119/70 -- -- 70 19 98 % -- --   03/17/23 0123 108/65 -- -- 72 16 98 % -- --   03/17/23 0110 118/71 -- -- 68 17 97 % -- --   03/17/23 0100 -- 97.9  F (36.6  C) -- 69 22 98 % -- --   03/17/23 0055 107/47 -- -- -- -- -- -- --   03/17/23 0050 114/71 -- -- -- -- -- -- --   03/16/23 2038 124/72 98.4  F (36.9  C) Oral -- -- 99 % -- --   03/16/23 1932 134/65 97.8  F (36.6  C) Oral -- 16 98 % -- --   03/16/23 1450 137/71 97.8  F (36.6  C) Oral -- 16 -- -- --   03/16/23 1149 (!) 145/86 -- -- -- 16 -- -- --   03/16/23 1141 -- -- -- -- -- -- 1.702 m (5' 7\") 96.2 kg (212 lb)   03/16/23 1134 (!) 149/90 -- -- -- 16 -- -- --       Gen: NAD  CV: Regular rate, well perfused  Resp: Non-labored breathing on room air  Abd: Soft, non-distended, appropriately tender, fundus firm below the umbilicus and non-tender  Inc: C/D/I - sterile island dressing in place  Ext: No appreciable lower extremity edema bilaterally (SCDs in place)    UOP: 675 mL ON    Labs:   Latest Reference Range & Units 03/16/23 " 12:08   Creatinine 0.51 - 0.95 mg/dL 0.70   ALT 10 - 35 U/L 51 (H)   AST 10 - 35 U/L 27   Hemoglobin 11.7 - 15.7 g/dL 13.3   Platelet Count 150 - 450 10e3/uL 176         A/P: Christian Nuñez is a 37 year old  who is POD#1 s/p PLTCS for Category 2 FHT remote from delivery. Pregnancy notable for gHTN, AMA, Rubella NI. Stable in the postoperative period.    # Postpartum/Postop  - Pain: Continue scheduled acetaminophen, scheduled Toradol x4 doses > ibuprofen, and prn oxycodone  - Heme: Appropriate blood loss during surgery. No s/s of ongoing blood loss. Hgb 13.3> > AM pending.  - GI: Bowel regimen. PRN simethicone QID. PRN antiemetics.  - : Robin in place  - PNC: Rh positive, Rubella nonimmune. MMR vaccine ordered for administration prior to discharge.  - Planning on breast feeding; will see Lactation  - Contraception: Will discuss prior to discharge.  - PPx: Encourage ambulation, IS, SCDs while confined to bed    #gHTN  - Currently normotensive; continue to monitor  - Asymptomatic  - Mild ALT elevation (HELLP labs otherwise wnl); continue to trend as indicated (next this AM)    Dispo: Anticipate discharge POD#2-3    Lindsey Gu MD, PGY-2  6:35 AM  [facility]    Patient seen and examined by me, agree with above resident note. Overall doing well and meeting early goals.  BPs have been wnl.  hgb 10.9.  Remove robin and ambulation encouraged.  Cont routine cares    KETTY PAZ MD

## 2023-03-17 NOTE — ANESTHESIA CARE TRANSFER NOTE
Patient: Christian Nuñez    Procedure: Procedure(s):   section       Diagnosis: * No pre-op diagnosis entered *  Diagnosis Additional Information: No value filed.    Anesthesia Type:   Spinal     Note:    Oropharynx: oropharynx clear of all foreign objects and spontaneously breathing  Level of Consciousness: awake  Oxygen Supplementation: room air    Independent Airway: airway patency satisfactory and stable  Dentition: dentition unchanged  Vital Signs Stable: post-procedure vital signs reviewed and stable  Report to RN Given: handoff report given  Patient transferred to: Labor and Delivery    Handoff Report: Identifed the Patient, Identified the Reponsible Provider, Reviewed the pertinent medical history, Discussed the surgical course, Reviewed Intra-OP anesthesia mangement and issues during anesthesia, Set expectations for post-procedure period and Allowed opportunity for questions and acknowledgement of understanding      Vitals:  Vitals Value Taken Time   BP     Temp     Pulse     Resp     SpO2         Electronically Signed By: Shabbir Burt MD  2023  12:53 AM

## 2023-03-17 NOTE — ANESTHESIA PROCEDURE NOTES
"Intrathecal injection Procedure Note    Pre-Procedure   Staff -        Anesthesiologist:  Augustus Mari DO       Resident/Fellow: Shabbir Burt MD       Performed By: resident and anesthesiologist       Location: OR       Procedure Start/Stop Times: 3/16/2023 11:05 PM and 3/16/2023 11:34 PM       Pre-Anesthestic Checklist: patient identified, IV checked, risks and benefits discussed, informed consent, monitors and equipment checked, pre-op evaluation, at physician/surgeon's request and post-op pain management  Timeout:       Correct Patient: Yes        Correct Procedure: Yes        Correct Site: Yes        Correct Position: Yes   Procedure Documentation  Procedure: intrathecal injection       Patient Position: sitting       Patient Prep/Sterile Barriers: sterile gloves, mask, patient draped       Skin prep: Chloraprep       Insertion Site: L3-4. (midline approach).       Needle Gauge: 25.        Needle Length (Inches): 3.5        Spinal Needle Type: Pencan       Introducer used       Introducer: 20 G       # of attempts: 3 and  # of redirects:     Assessment/Narrative         Paresthesias: No.       CSF fluid: clear.       Opening pressure was cmH2O while  Sitting.      Medication(s) Administered   0.75% Hyperbaric Bupivacaine (Intrathecal) - Intrathecal   1.6 mL - 3/16/2023 11:05:00 PM  Fentanyl PF (Intrathecal) - Intrathecal   15 mcg - 3/16/2023 11:05:00 PM  Morphine PF 1 mg/mL (Intrathecal) - Intrathecal   0.15 mg - 3/16/2023 11:05:00 PM  Medication Administration Time: 3/16/2023 11:05 PM     Comments:  Initial attempt in lateral position not successful. Successful in sitting position      FOR [facility] ([facility] ONLY:   Pain Team Contact information: please page the Pain Team Via [employer]. Search \"Pain\". During daytime hours, please page the attending first. At night please page the resident first.    "

## 2023-03-17 NOTE — ANESTHESIA PROCEDURE NOTES
TAP Procedure Note    Pre-Procedure   Staff -        Anesthesiologist:  Augustus Mari DO       Resident/Fellow: Shabbir Burt MD       Performed By: resident       Location: OB       Procedure Start/Stop Times: 3/17/2023 2:20 AM and 3/17/2023 2:28 AM       Pre-Anesthestic Checklist: patient identified, IV checked, site marked, risks and benefits discussed, informed consent, monitors and equipment checked, pre-op evaluation, at physician/surgeon's request and post-op pain management  Timeout:       Correct Patient: Yes        Correct Procedure: Yes        Correct Site: Yes        Correct Position: Yes        Correct Laterality: Yes        Site Marked: Yes  Procedure Documentation  Procedure: TAP       Laterality: bilateral       Patient Position: supine       Patient Prep/Sterile Barriers: sterile gloves, mask       Skin prep: Chloraprep       Needle Type: short bevel       Needle Gauge: 21.        Needle Length (millimeters): 110        Ultrasound guided       1. Ultrasound was used to identify targeted nerve, plexus, vascular marker, or fascial plane and place a needle adjacent to it in real-time.       2. Ultrasound was used to visualize the spread of anesthetic in close proximity to the above referenced structure.       3. A permanent image is entered into the patient's record.    Assessment/Narrative         The placement was negative for: blood aspirated, painful injection and site bleeding       Paresthesias: No.       Bolus given via needle. no blood aspirated via catheter.        Secured via.        Insertion/Infusion Method: Single Shot       Complications: none       Injection made incrementally with aspirations every 5 mL.    Medication(s) Administered   Bupivacaine 0.25% PF (Infiltration) - Infiltration   20 mL - 3/17/2023 2:20:00 AM  Bupivacaine liposome (Exparel) 1.3% LA inj susp (Infiltration) - Infiltration   20 mL - 3/17/2023 2:20:00 AM  Medication Administration Time: 3/17/2023 2:20  "AM      FOR [facility] ([facility]) ONLY:   Pain Team Contact information: please page the Pain Team Via [employer]. Search \"Pain\". During daytime hours, please page the attending first. At night please page the resident first.    "

## 2023-03-17 NOTE — OP NOTE
[facility]   Delivery Full Operative Note     Surgery Date:  3/16/2023    Surgeon:  Dr. Aracelis Buchanan MD    Assistants:  Lindsey Gu MD, PGY-2    Adrianna Maradiaga MS3    Pre-op Diagnosis:    - Intrauterine pregnancy at 39w1d  - Category 2 fetal heart rate tracing remote from delivery  - Gestational hypertension    Post-op Diagnosis:    - Same as above, s/p below stated procedure  - Liveborn male infant     Procedure:  Primary low-transverse  section with two layer uterine closure via Pfannenstiel incision    Anesthesia: Spinal    EBL:  288 mL    IVF:  1400 mL crystalloid    UOP:  150 mL clear urine at the end of the case    Drains: Larsen Catheter     Specimens:  Cord blood    Complications: None apparent    Indications:   Christian Nuñez is a 37 year old  at 39w1d admitted for IOL in the setting of newly diagnosed gestational hypertension.  Cervix closed on presentation but patient mercedes frequently, so induction began with Cervidil, which was removed in the setting of a prolonged deceleration. Fetal heart rate recovered with maternal position changes and IV fluid bolus. Category 1 reactive tracing resumed. Patient received 1 dose PO misoprostol for cervical ripening but prolonged decelerations occurred again and delivery by  section was recommended.  The risks, benefits, and alternatives of  section were discussed with the patient, and she agreed to proceed. Patient brought urgently to OR during a deceleration but upon arrival to OR, fetal heart rate had returned to baseline so decision was made to proceed with spinal.     Findings:   1. No adhesions.  2. Clear amniotic fluid.  3. Liveborn male infant in ROT presentation. Apgars 9 at 1 minute & 9 at 5 minutes. Weight 3.062 kg.  4. Small hematoma approximately 1 x 2 cm in size noted on the inferior/right aspect of the hysterotomy, not expanding in size at the time of closure.   5. Subserosal  fibroid (presumed FIGO Grade 6) about 2 cm in size seen on the anterior mid uterus, with tissue noted to be friable. Hemostasis achieved with electrocautery.  6. SURGIFLO placed over the above noted fibroid and hysterotomy.  7. Otherwise normal uterus, fallopian tubes, and ovaries.     Procedure Details:   The patient was brought to the OR, where adequate spinal anesthesia was administered. She was placed in the dorsal supine position with a slight leftward tilt. She was prepped and draped in the usual sterile fashion. A surgical time out was performed. A Pfannenstiel skin incision was made with the scalpel, and carried down to the underlying fascia with sharp and blunt dissection. The fascia was incised in the midline, and the incision was extended bluntly. The rectus muscles were  in the midline, and the peritoneum was entered bluntly, and the opening was extended with digital pressure. The bladder blade was placed. The position of the vesicouterine peritoneum was noted. A transverse hysterotomy was made with the scalpel in the lower uterine segment, using Allis clamps x2 to elevate the tissue, and the incision was extended with digital pressure. The infant was noted to be in the ROT position, and was delivered atraumatically. The shoulders delivered easily. Nuchal cord was noted x1, loose, reduced. The cord was doubly clamped and cut, and the infant was handed off to the awaiting nursery staff. A segment of cord was cut and held for gases if needed. The placenta was delivered with gentle traction on the umbilical cord and uterine massage. The uterus was left in situ and cleared of all clots and debris. Uterine tone was noted to be firm with pitocin given through the running IV and uterine massage. The hysterotomy was closed with a running locked suture of 0-Vicryl. The hysterotomy was then imbricated horizontally using an 0-Vicryl suture. The hysterotomy was noted to be hemostatic. The pericolic gutters  were cleared of all clots and debris. The hysterotomy was reexamined and noted to be hemostatic. Bleeding from the above noted subserosal fibroid was controlled with electrocautery, and SURGIFLO was placed over the fibroid and the hysterotomy. The fascia and rectus muscles were examined and areas of oozing were controlled with electrocautery. The fascia was closed with a running 0-Vicryl suture. The subcutaneous tissue was irrigated and areas of oozing were controlled with electrocautery. The subcutaneous tissue was greater than 2 cm in thickness, and was therefore closed in a running fashion with 3-0 plain. The skin was closed with 4-0 Monocryl in a running subcuticular fashion and covered with Steri-strips and a sterile dressing.    All sponge, needle, and instrument counts were correct. A surgical debrief was performed. The patient tolerated the procedure well, and was transferred to recovery in stable condition. Dr. Buchanan was present and scrubbed for the entirety of the procedure.     Lindsey Gu MD, PGY-2  1:15 AM  [facility]      ATTESTATION:   I was scrubbed and present for the entire procedure. I agree with the above note which I have edited to reflect my findings.   Aracelis Buchanan MD

## 2023-03-18 PROCEDURE — 250N000011 HC RX IP 250 OP 636

## 2023-03-18 PROCEDURE — 120N000002 HC R&B MED SURG/OB UMMC

## 2023-03-18 PROCEDURE — 250N000013 HC RX MED GY IP 250 OP 250 PS 637

## 2023-03-18 RX ORDER — AMOXICILLIN 250 MG
1 CAPSULE ORAL DAILY
Qty: 100 TABLET | Refills: 0 | Status: SHIPPED | OUTPATIENT
Start: 2023-03-18 | End: 2023-03-18

## 2023-03-18 RX ORDER — ACETAMINOPHEN 325 MG/1
650 TABLET ORAL EVERY 6 HOURS PRN
Qty: 100 TABLET | Refills: 0 | Status: SHIPPED | OUTPATIENT
Start: 2023-03-18 | End: 2023-04-26

## 2023-03-18 RX ORDER — IBUPROFEN 600 MG/1
600 TABLET, FILM COATED ORAL EVERY 6 HOURS PRN
Qty: 60 TABLET | Refills: 0 | Status: SHIPPED | OUTPATIENT
Start: 2023-03-18 | End: 2023-04-26

## 2023-03-18 RX ORDER — ACETAMINOPHEN 325 MG/1
650 TABLET ORAL EVERY 6 HOURS PRN
Qty: 100 TABLET | Refills: 0 | Status: SHIPPED | OUTPATIENT
Start: 2023-03-18 | End: 2023-03-18

## 2023-03-18 RX ORDER — AMOXICILLIN 250 MG
1 CAPSULE ORAL DAILY
Qty: 100 TABLET | Refills: 0 | Status: SHIPPED | OUTPATIENT
Start: 2023-03-18 | End: 2023-04-26

## 2023-03-18 RX ORDER — IBUPROFEN 600 MG/1
600 TABLET, FILM COATED ORAL EVERY 6 HOURS PRN
Qty: 60 TABLET | Refills: 0 | Status: SHIPPED | OUTPATIENT
Start: 2023-03-18 | End: 2023-03-18

## 2023-03-18 RX ADMIN — IBUPROFEN 800 MG: 800 TABLET, FILM COATED ORAL at 13:18

## 2023-03-18 RX ADMIN — OXYCODONE HYDROCHLORIDE 5 MG: 5 TABLET ORAL at 08:48

## 2023-03-18 RX ADMIN — ACETAMINOPHEN 975 MG: 325 TABLET ORAL at 17:32

## 2023-03-18 RX ADMIN — IBUPROFEN 800 MG: 800 TABLET, FILM COATED ORAL at 18:50

## 2023-03-18 RX ADMIN — SENNOSIDES AND DOCUSATE SODIUM 2 TABLET: 50; 8.6 TABLET ORAL at 08:48

## 2023-03-18 RX ADMIN — OXYCODONE HYDROCHLORIDE 5 MG: 5 TABLET ORAL at 16:41

## 2023-03-18 RX ADMIN — ACETAMINOPHEN 975 MG: 325 TABLET ORAL at 11:05

## 2023-03-18 RX ADMIN — NALBUPHINE HYDROCHLORIDE 2.5 MG: 10 INJECTION, SOLUTION INTRAMUSCULAR; INTRAVENOUS; SUBCUTANEOUS at 00:14

## 2023-03-18 RX ADMIN — IBUPROFEN 800 MG: 800 TABLET, FILM COATED ORAL at 07:24

## 2023-03-18 RX ADMIN — ACETAMINOPHEN 975 MG: 325 TABLET ORAL at 04:57

## 2023-03-18 RX ADMIN — SENNOSIDES AND DOCUSATE SODIUM 2 TABLET: 50; 8.6 TABLET ORAL at 22:54

## 2023-03-18 RX ADMIN — OXYCODONE HYDROCHLORIDE 5 MG: 5 TABLET ORAL at 22:54

## 2023-03-18 RX ADMIN — OXYCODONE HYDROCHLORIDE 5 MG: 5 TABLET ORAL at 12:47

## 2023-03-18 ASSESSMENT — ACTIVITIES OF DAILY LIVING (ADL)
ADLS_ACUITY_SCORE: 22

## 2023-03-18 NOTE — PLAN OF CARE
Goal Outcome Evaluation:       VSS. Postpartum assessment WNL. C/o incisional pain, well managed with tylenol, ibuprofen, and oxycodone. Discussed plans to space out oxycodone doses over night in preparation for homegoing. Breastfeeding independently. Pt declined watching educational videos so discussed postpartum depression and never shake a baby videos. Spouse present and attentive.

## 2023-03-18 NOTE — LACTATION NOTE
This note was copied from a baby's chart.  Consult for: First time breastfeeding    Delivery Information: Pio was born at 39.1 weeks via c/s on [birth year] at 2355.    Maternal Health History: Christian has a history of AMA (37 years old), gHTN, depression and anxiety. She noted breast growth and sensitivity in early pregnancy. She denies any history of breast/chest injury or surgery. She has been able to hand express colostrum. ?  ?  Infant information: Pio has age appropriate output and weight loss. He was AGA at birth at 6lb 12 oz.  ?    Weight Change Since Birth: -6.6%    Feeding Assessment:  Christian shares that she was given a nipple shield to help with latching overnight but she would prefer to latch without if they are able. Infant was supplemented with donor milk via bottle x 1 overnight.    Prior to latch we reviewed tips to get a deep, asymmetric latch. With minimal support Christian was able to position Pio, aiming her nipple to his nose, and achieve what appeared to be a deep, asymmetric latch. Pio was able to sustain the latch and demonstrated sustained, coordinated sucking. Christian denied discomfort.    Christian has been hand expressing colostrum for breast stimulation. She was encouraged to continue this with feedings, and if supplemental milk given, also pump.       Education: early feeding cues, benefits of feeding on cue, breastfeeding positions, signs breastfeeding is going well (comfortable latch, age appropriate output and weight loss, swallowing heard at the breast), satiety cues, expected  output,  weight loss, nutritive vs non-nutritive sucking, benefits of skin to skin, the Second Night, benefits of breast massage and hand expression of colostrum, Infant Feeding Log handout, inpatient lactation support and outpatient lactation resources.     Plan: Continue breastfeeding on cue with RN support as needed with a goal of 8-12 feedings per day. Encourage frequent skin to skin, breast massage  and hand expression.     Family plans to follow up with [facility].       Ada Junior RN, IBCLC  Lactation Consultant  Ascom: *[phone]  Office: [phone]

## 2023-03-18 NOTE — PLAN OF CARE
Pt having trouble breastfeeding, baby latching and suckling a few times then lets go. Attempted several times with no success. Pt is very frustrated , baby crying a lot. Reassured pt and encouraged her to keep breastfeeding , pumping and expressing milk. Pt voices concern about being very tired and needing some sleep. Pt almost in tears and father also looks tired. Pt is open to donor milk. Explained to pt that I will take the baby to nursery so they can get some sleep.

## 2023-03-18 NOTE — PROGRESS NOTES
OB Postpartum Progress Note  POD#2 s/p PLTCS for Cat 2 RFD    S: Feeling well this morning. Pain well controlled. Tolerating PO without nausea or emesis. Voiding spontaneously. Passing some flatus. Ambulating without dizziness or lightheadedness. Breast feeding but having trouble with milk supply and latching. Wanted to be seen by lactation yesterday but they were not in house. Lochia light.    O:  Patient Vitals for the past 24 hrs:   BP Temp Temp src Pulse Resp SpO2   23 0316 109/64 97.9  F (36.6  C) Oral 72 16 --   23 2344 139/79 97.9  F (36.6  C) Oral 74 16 --   23 1942 121/78 97.8  F (36.6  C) Oral 72 16 99 %   23 1500 126/71 98.2  F (36.8  C) Oral 71 16 99 %   23 1058 115/59 98  F (36.7  C) Oral 74 16 99 %     Gen: NAD  CV: Regular rate, well perfused  Resp: Non-labored breathing on room air  Abd: Soft, non-distended, appropriately tender, fundus firm below the umbilicus and non-tender  Inc: C/D/I - sterile island dressing in place  Ext: No appreciable lower extremity edema bilaterally (SCDs in place)    UOP: 2,000 mL yesterday    Labs:   Latest Reference Range & Units 23 12:08   Creatinine 0.51 - 0.95 mg/dL 0.70   ALT 10 - 35 U/L 51 (H)   AST 10 - 35 U/L 27   Hemoglobin 11.7 - 15.7 g/dL 13.3   Platelet Count 150 - 450 10e3/uL 176       A/P: Christian Nuñez is a 37 year old  who is POD#2 s/p PLTCS for Category 2 FHT remote from delivery. Pregnancy notable for gHTN, AMA, Rubella NI. Stable in the postoperative period.    Postpartum/Postop  - Pain: Continue scheduled acetaminophen, scheduled ibuprofen, and prn oxycodone  - Heme: Appropriate blood loss during surgery. No s/s of ongoing blood loss. Hgb 13.3> > 10.9.  - GI: Bowel regimen. PRN simethicone QID. PRN antiemetics.  - : s/p robin; voiding spontaneously  - PNC: Rh positive, Rubella nonimmune. MMR vaccine ordered for administration prior to discharge.  - Breast feeding; lactation today if available  -  Contraception: considering options, discussed POPs/Nexplanon/Depo prior to discharge vs OCPs or IUD at 6 weeks  - PPx: Encourage ambulation, IS, SCDs while confined to bed    gHTN  - Normotensive; continue to monitor  - Asymptomatic  - Mild ALT elevation at admission, downtrending (51>35) otherwise HELLP labs wnl    Dispo: Anticipate discharge either this evening or tomorrow pending breast feeding    Aracelis Brandon MD  Ob/Gyn PGY-1  03/18/23 6:24 AM    Physician Attestation   I personally examined and evaluated this patient.  I discussed the patient with the resident/fellow and care team, and agree with the assessment and plan of care as documented in the note.     Key findings: Patient states her pain is picking up a little bit.  Medications given are helping.  Patient has not yet taken a shower since surgery.  ABD and island dressing in place without shadowing.  Encouraged ambulation today and removal of bandage in shower.  Plan discharge to home tomorrow.     Niki Pop MD  Date of Service (when I saw the patient): 03/18/23

## 2023-03-18 NOTE — PLAN OF CARE
Goal Outcome Evaluation: VSS. Postpartum checks WDL. Up independently, voiding without difficulty. Pain managed with tylenol, ibuprofen, and oxycodone. Incisional drsg CDI- plans to shower today, nurse to assist with drsg removal. Abdominal binder in place. Breastfeeding well, pumped x1.

## 2023-03-19 LAB
ALT SERPL W P-5'-P-CCNC: 37 U/L (ref 10–35)
AST SERPL W P-5'-P-CCNC: 43 U/L (ref 10–35)
CREAT SERPL-MCNC: 0.72 MG/DL (ref 0.51–0.95)
ERYTHROCYTE [DISTWIDTH] IN BLOOD BY AUTOMATED COUNT: 13 % (ref 10–15)
GFR SERPL CREATININE-BSD FRML MDRD: >90 ML/MIN/1.73M2
HCT VFR BLD AUTO: 32.1 % (ref 35–47)
HGB BLD-MCNC: 10.6 G/DL (ref 11.7–15.7)
MCH RBC QN AUTO: 32.8 PG (ref 26.5–33)
MCHC RBC AUTO-ENTMCNC: 33 G/DL (ref 31.5–36.5)
MCV RBC AUTO: 99 FL (ref 78–100)
PLATELET # BLD AUTO: 161 10E3/UL (ref 150–450)
RBC # BLD AUTO: 3.23 10E6/UL (ref 3.8–5.2)
WBC # BLD AUTO: 9.5 10E3/UL (ref 4–11)

## 2023-03-19 PROCEDURE — 82565 ASSAY OF CREATININE: CPT

## 2023-03-19 PROCEDURE — 250N000013 HC RX MED GY IP 250 OP 250 PS 637

## 2023-03-19 PROCEDURE — 250N000011 HC RX IP 250 OP 636

## 2023-03-19 PROCEDURE — 36415 COLL VENOUS BLD VENIPUNCTURE: CPT

## 2023-03-19 PROCEDURE — 84460 ALANINE AMINO (ALT) (SGPT): CPT

## 2023-03-19 PROCEDURE — 258N000003 HC RX IP 258 OP 636

## 2023-03-19 PROCEDURE — 84450 TRANSFERASE (AST) (SGOT): CPT

## 2023-03-19 PROCEDURE — 120N000002 HC R&B MED SURG/OB UMMC

## 2023-03-19 PROCEDURE — 85027 COMPLETE CBC AUTOMATED: CPT

## 2023-03-19 RX ORDER — MAGNESIUM SULFATE HEPTAHYDRATE 40 MG/ML
4 INJECTION, SOLUTION INTRAVENOUS
Status: DISCONTINUED | OUTPATIENT
Start: 2023-03-19 | End: 2023-03-21 | Stop reason: HOSPADM

## 2023-03-19 RX ORDER — MAGNESIUM SULFATE HEPTAHYDRATE 40 MG/ML
2 INJECTION, SOLUTION INTRAVENOUS
Status: ACTIVE | OUTPATIENT
Start: 2023-03-19 | End: 2023-03-20

## 2023-03-19 RX ORDER — OXYCODONE HYDROCHLORIDE 5 MG/1
5 TABLET ORAL EVERY 4 HOURS PRN
Qty: 15 TABLET | Refills: 0 | Status: SHIPPED | OUTPATIENT
Start: 2023-03-19 | End: 2023-04-26

## 2023-03-19 RX ORDER — HYDRALAZINE HYDROCHLORIDE 20 MG/ML
10 INJECTION INTRAMUSCULAR; INTRAVENOUS
Status: DISCONTINUED | OUTPATIENT
Start: 2023-03-19 | End: 2023-03-21 | Stop reason: HOSPADM

## 2023-03-19 RX ORDER — LABETALOL HYDROCHLORIDE 5 MG/ML
20-80 INJECTION, SOLUTION INTRAVENOUS EVERY 10 MIN PRN
Status: DISCONTINUED | OUTPATIENT
Start: 2023-03-19 | End: 2023-03-21 | Stop reason: HOSPADM

## 2023-03-19 RX ORDER — POLYETHYLENE GLYCOL 3350 17 G/17G
17 POWDER, FOR SOLUTION ORAL DAILY
Status: DISCONTINUED | OUTPATIENT
Start: 2023-03-19 | End: 2023-03-21 | Stop reason: HOSPADM

## 2023-03-19 RX ORDER — CALCIUM GLUCONATE 94 MG/ML
1 INJECTION, SOLUTION INTRAVENOUS
Status: DISCONTINUED | OUTPATIENT
Start: 2023-03-19 | End: 2023-03-21 | Stop reason: HOSPADM

## 2023-03-19 RX ORDER — SODIUM CHLORIDE, SODIUM LACTATE, POTASSIUM CHLORIDE, CALCIUM CHLORIDE 600; 310; 30; 20 MG/100ML; MG/100ML; MG/100ML; MG/100ML
INJECTION, SOLUTION INTRAVENOUS
Status: DISPENSED
Start: 2023-03-19 | End: 2023-03-20

## 2023-03-19 RX ORDER — MAGNESIUM SULFATE HEPTAHYDRATE 500 MG/ML
10 INJECTION, SOLUTION INTRAMUSCULAR; INTRAVENOUS
Status: DISCONTINUED | OUTPATIENT
Start: 2023-03-19 | End: 2023-03-21 | Stop reason: HOSPADM

## 2023-03-19 RX ORDER — MAGNESIUM SULFATE IN WATER 40 MG/ML
2 INJECTION, SOLUTION INTRAVENOUS CONTINUOUS
Status: DISPENSED | OUTPATIENT
Start: 2023-03-19 | End: 2023-03-20

## 2023-03-19 RX ORDER — SODIUM CHLORIDE, SODIUM LACTATE, POTASSIUM CHLORIDE, CALCIUM CHLORIDE 600; 310; 30; 20 MG/100ML; MG/100ML; MG/100ML; MG/100ML
10-125 INJECTION, SOLUTION INTRAVENOUS CONTINUOUS
Status: ACTIVE | OUTPATIENT
Start: 2023-03-19 | End: 2023-03-20

## 2023-03-19 RX ORDER — MAGNESIUM SULFATE HEPTAHYDRATE 40 MG/ML
4 INJECTION, SOLUTION INTRAVENOUS ONCE
Status: COMPLETED | OUTPATIENT
Start: 2023-03-19 | End: 2023-03-19

## 2023-03-19 RX ORDER — NIFEDIPINE 30 MG/1
30 TABLET, EXTENDED RELEASE ORAL DAILY
Status: DISCONTINUED | OUTPATIENT
Start: 2023-03-19 | End: 2023-03-20

## 2023-03-19 RX ORDER — LORAZEPAM 2 MG/ML
2 INJECTION INTRAMUSCULAR
Status: DISCONTINUED | OUTPATIENT
Start: 2023-03-19 | End: 2023-03-21 | Stop reason: HOSPADM

## 2023-03-19 RX ADMIN — SENNOSIDES AND DOCUSATE SODIUM 2 TABLET: 50; 8.6 TABLET ORAL at 20:22

## 2023-03-19 RX ADMIN — ACETAMINOPHEN 975 MG: 325 TABLET ORAL at 00:23

## 2023-03-19 RX ADMIN — SODIUM CHLORIDE, POTASSIUM CHLORIDE, SODIUM LACTATE AND CALCIUM CHLORIDE 30 ML/HR: 600; 310; 30; 20 INJECTION, SOLUTION INTRAVENOUS at 14:07

## 2023-03-19 RX ADMIN — MAGNESIUM SULFATE HEPTAHYDRATE 4 G: 40 INJECTION, SOLUTION INTRAVENOUS at 14:08

## 2023-03-19 RX ADMIN — ACETAMINOPHEN 975 MG: 325 TABLET ORAL at 06:17

## 2023-03-19 RX ADMIN — OXYCODONE HYDROCHLORIDE 5 MG: 5 TABLET ORAL at 05:04

## 2023-03-19 RX ADMIN — IBUPROFEN 800 MG: 800 TABLET, FILM COATED ORAL at 12:23

## 2023-03-19 RX ADMIN — IBUPROFEN 800 MG: 800 TABLET, FILM COATED ORAL at 00:23

## 2023-03-19 RX ADMIN — OXYCODONE HYDROCHLORIDE 5 MG: 5 TABLET ORAL at 09:02

## 2023-03-19 RX ADMIN — POLYETHYLENE GLYCOL 3350 17 G: 17 POWDER, FOR SOLUTION ORAL at 10:00

## 2023-03-19 RX ADMIN — MAGNESIUM SULFATE HEPTAHYDRATE 2 G/HR: 40 INJECTION, SOLUTION INTRAVENOUS at 14:43

## 2023-03-19 RX ADMIN — IBUPROFEN 800 MG: 800 TABLET, FILM COATED ORAL at 06:17

## 2023-03-19 RX ADMIN — OXYCODONE HYDROCHLORIDE 5 MG: 5 TABLET ORAL at 17:09

## 2023-03-19 RX ADMIN — OXYCODONE HYDROCHLORIDE 5 MG: 5 TABLET ORAL at 12:58

## 2023-03-19 RX ADMIN — ACETAMINOPHEN 975 MG: 325 TABLET ORAL at 12:24

## 2023-03-19 RX ADMIN — ACETAMINOPHEN 975 MG: 325 TABLET ORAL at 18:33

## 2023-03-19 RX ADMIN — IBUPROFEN 800 MG: 800 TABLET, FILM COATED ORAL at 18:33

## 2023-03-19 RX ADMIN — NIFEDIPINE 30 MG: 30 TABLET, FILM COATED, EXTENDED RELEASE ORAL at 14:06

## 2023-03-19 RX ADMIN — SENNOSIDES AND DOCUSATE SODIUM 2 TABLET: 50; 8.6 TABLET ORAL at 09:02

## 2023-03-19 ASSESSMENT — ACTIVITIES OF DAILY LIVING (ADL)
ADLS_ACUITY_SCORE: 22

## 2023-03-19 NOTE — PROGRESS NOTES
Brief Postpartum Progress Note    Patient with gHTN, normotensive but this morning with 2 MRBPs and mild headache.     - Repeat HELLP labs  - Repeat blood pressure, if still elevated will start Nifed 30   - Planning for home today, Discharge pending clinical course    Aracelis Brandon MD  Ob/Gyn PGY-1  03/19/23 10:15 AM

## 2023-03-19 NOTE — PLAN OF CARE
Afebrile, blood pressure elevated x 1 overnight, other VSS. Patient denies headache, visual changes or epigastric pain. She is ambulating and voiding without difficulty. She is independent with self and baby cares. States incision pain and cramping controlled with scheduled ibuprofen, tylenol and prn oxycodone. Incision intact with steri strips, no s/s infection noted. Patient is breastfeeding independently with minimal assistance for positioning and latch. Baby has weight loss of 9.7% and has started donor milk supplements. Mother hand expressing and pumping. Plan to recheck BP this am and notify MD of any further elevated BPS. Will continue to provide support and education as needed. Continue present cares.

## 2023-03-19 NOTE — PROVIDER NOTIFICATION
03/19/23 1300   Provider Notification   Provider Name/Title G1 Aleksandr   Method of Notification Electronic Page   Request Evaluate-Remote   Notification Reason Vital Signs Change     Last /86

## 2023-03-19 NOTE — LACTATION NOTE
This note was copied from a baby's chart.  Follow Up Consult     Maternal Assessment: Christian shares she got some sleep overnight. She has a headache today so she is unsure if she is discharging.     Infant Assessment:  Pio has age appropriate output. He was at -9.7% weight loss at 48 hours of age.     Weight Change Since Birth: -9.7% at 48 hours.     Feeding History: Due to weight loss, Christian has been breastfeeding Pio every 2-3 hours, is pumping with feedings and has been supplementing with donor milk via bottle.     Feeding Assessment: Christian has been independently positioning and latching Pio. She denies discomfort with breastfeeding. Pio was latched in the cross cradle position during my visit. He appeared to have a deep latch and was heard swallowing during the feeding.     Education: indications for supplement, outpatient donor milk, pumping recommendations and outpatient follow up.       Plan: Continue breastfeeding on cue with RN support as needed with a goal of 8-12 feedings per day. Encourage frequent skin to skin, breast massage and hand expression.     Continue supplementing per Pediatrician recommendations and pump with feedings.      Ada Junior RN, IBCLC  Lactation Consultant  Ascom: [phone]  Office: [phone]

## 2023-03-19 NOTE — PROVIDER NOTIFICATION
03/19/23 1013   Provider Notification   Provider Name/Title G1 Aleksandr   Method of Notification Electronic Page   Request Evaluate-Remote   Notification Reason Status Update     Pt reporting new headache, last /90.

## 2023-03-19 NOTE — PLAN OF CARE
Goal Outcome Evaluation:       VSS. Postpartum assessment WNL. Incisional pain well managed with tylenol, ibuprofen, and oxycodone. Pt states headache is resolving. Tolerating magnesium drip. DTR WNL. BP cuff given for home. Breastfeeding independently. Pumping occasionally with feeds to use to supplement baby. Spouse present and attentive.

## 2023-03-19 NOTE — PROGRESS NOTES
OB Postpartum Progress Note  POD#2 s/p PLTCS for Cat 2 RFD    S: Feeling well this morning. Pain well controlled. Tolerating PO without nausea or emesis. Voiding spontaneously. Passing some flatus. Ambulating without dizziness or lightheadedness. Breast feeding. Lochia light.    O:  Patient Vitals for the past 24 hrs:   BP Temp Temp src Pulse Resp   23 0501 114/67 97.9  F (36.6  C) Oral 70 16   23 0031 (!) 143/88 -- -- -- --   23 0025 -- 97.7  F (36.5  C) Oral 76 16   23 1641 134/81 98  F (36.7  C) Oral 70 12   23 0929 132/84 97.6  F (36.4  C) Oral 72 16     Gen: NAD  CV: Regular rate, well perfused  Resp: Non-labored breathing on room air  Abd: Soft, non-distended, appropriately tender, fundus firm below the umbilicus and non-tender  Inc: C/D/I     A/P: Christian Nuñez is a 37 year old  who is POD#2 s/p PLTCS for Category 2 FHT remote from delivery. Pregnancy notable for gHTN, AMA, Rubella NI. Stable in the postoperative period.    Postpartum/Postop  - Pain: Continue scheduled acetaminophen, scheduled ibuprofen, and prn oxycodone  - Heme: Appropriate blood loss during surgery. No s/s of ongoing blood loss. Hgb 13.3> > 10.9.  - GI: Bowel regimen. PRN simethicone QID. PRN antiemetics.  - : s/p robin; voiding spontaneously  - PNC: Rh positive, Rubella nonimmune. MMR vaccine ordered for administration prior to discharge.  - Breast feeding  - Contraception: considering options, discussed POPs/Nexplanon/Depo prior to discharge vs OCPs or IUD at 6 weeks  - PPx: Encourage ambulation, IS, SCDs while confined to bed    gHTN  - Normotensive with one mild range early this AM; continue to monitor  - Asymptomatic  - Mild ALT elevation at admission, downtrending (51>35) otherwise HELLP labs wnl    Dispo: Home today    Aracelis Brandon MD  Ob/Gyn PGY-1  03/19/23 7:56 AM    Patient seen and examined by me, agree with above resident note. Doing well and meeting all goals, stable for  discharge.  Instructions given.  RTC 1 week for bp check and 6 weeks for pp    KETTY PAZ MD

## 2023-03-19 NOTE — PROGRESS NOTES
Brief Post-partum Progress Note    S: To patient bedside to assess headache, vision changes, and MRBPs. Reports development of headache this morning. Just received Tylenol and cannot yet tell if it is improving. Does not see black spots in vision but does notice blurred dots and points that are floating. Otherwise, no chest pain, shortness of breath, or RUQ pain.     O:   Vitals:    23 0501 23 0936 23 1049 23 1150   BP: 114/67 (!) 132/90 137/82 134/79   BP Location: Left arm Left arm Left arm    Patient Position: Semi-Marrufo's Semi-Marrufo's     Cuff Size: Adult Regular Adult Regular     Pulse: 70 72  71   Resp: 16 16     Temp: 97.9  F (36.6  C) 98  F (36.7  C)     TempSrc: Oral Oral     SpO2:       Weight:       Height:       Gen: Alert, NAD  CV: RRR, no murmurs  Pulm: CTAB  Abd: Soft, no tenderness to palpation  Ext: Brachioradialis reflex 2+ bilaterally    A/P:   Christian Nuñez is a 38 yo  POD#3 PLTCS with gHTN now with HA, vision changes and MRBPs. HELLP labs re-checked and notable for increase in AST from 29>43, otherwise stable. Discussed that we want to ensure she is not progressing to pre-eclampsia. If headache fails to respond with Tylenol, visual changes are persistent, or blood pressures become severe range would recommend 24 hours Mag for seizure prophylaxis. Patient understanding.     - q1hr vitals; if continued MRBP recommend Nifed 30 daily  - Asked patient to tell bedside RN how her headache feels in 30 minutes. If unresolved with Tylenol may try adjuncts such a Reglan.  - IV antihypertensives PRN for SBP >160/DBP >110   - Repeat HELLP labs PRN  - Mag Sulfate if any criteria for Pre-E w/SF met    Aracelis Brandon MD  Ob/Gyn PGY-1  23 12:12 PM    Addendum: BP continue to rise (157/86). Headache and visual changes still present. Symptoms and elevating blood pressure suggest impending diagnosis of SI Pre-Eclampsia with Severe Features. Recommend 24 hr Mag Sulfate and  starting Nifedipine 30. Alternatively, could start Nifedipine and monitor. Risk would be re-presenting through ED with Pre-E. Patient agreeable with plan to proceed with 24h Mag Sulfate. Orders in. Discussed with bedside RN.

## 2023-03-19 NOTE — PLAN OF CARE
Goal Outcome Evaluation: BPs elevated. Reported new headache this morning and then this afternoon reported spots in vision. Postpartum checks WDL. Up independently, voiding without difficulty. Incision LUCAS with steri-strips in place. Pain managed with tylenol, ibuprofen, and oxycodone. Breastfeeding well and pumping to supplement infant.

## 2023-03-20 LAB
ALT SERPL W P-5'-P-CCNC: 85 U/L (ref 10–35)
AST SERPL W P-5'-P-CCNC: 84 U/L (ref 10–35)
CREAT SERPL-MCNC: 0.58 MG/DL (ref 0.51–0.95)
ERYTHROCYTE [DISTWIDTH] IN BLOOD BY AUTOMATED COUNT: 12.8 % (ref 10–15)
GFR SERPL CREATININE-BSD FRML MDRD: >90 ML/MIN/1.73M2
HCT VFR BLD AUTO: 35.6 % (ref 35–47)
HGB BLD-MCNC: 11.8 G/DL (ref 11.7–15.7)
MCH RBC QN AUTO: 32.5 PG (ref 26.5–33)
MCHC RBC AUTO-ENTMCNC: 33.1 G/DL (ref 31.5–36.5)
MCV RBC AUTO: 98 FL (ref 78–100)
PLATELET # BLD AUTO: 192 10E3/UL (ref 150–450)
RBC # BLD AUTO: 3.63 10E6/UL (ref 3.8–5.2)
WBC # BLD AUTO: 8.4 10E3/UL (ref 4–11)

## 2023-03-20 PROCEDURE — 82565 ASSAY OF CREATININE: CPT

## 2023-03-20 PROCEDURE — 258N000003 HC RX IP 258 OP 636

## 2023-03-20 PROCEDURE — 84450 TRANSFERASE (AST) (SGOT): CPT

## 2023-03-20 PROCEDURE — 36415 COLL VENOUS BLD VENIPUNCTURE: CPT

## 2023-03-20 PROCEDURE — 84460 ALANINE AMINO (ALT) (SGPT): CPT

## 2023-03-20 PROCEDURE — 120N000002 HC R&B MED SURG/OB UMMC

## 2023-03-20 PROCEDURE — 85014 HEMATOCRIT: CPT

## 2023-03-20 PROCEDURE — 250N000013 HC RX MED GY IP 250 OP 250 PS 637

## 2023-03-20 PROCEDURE — 250N000011 HC RX IP 250 OP 636

## 2023-03-20 RX ORDER — NIFEDIPINE 30 MG/1
30 TABLET, EXTENDED RELEASE ORAL EVERY 24 HOURS
Status: DISCONTINUED | OUTPATIENT
Start: 2023-03-20 | End: 2023-03-21 | Stop reason: HOSPADM

## 2023-03-20 RX ORDER — SODIUM CHLORIDE, SODIUM LACTATE, POTASSIUM CHLORIDE, CALCIUM CHLORIDE 600; 310; 30; 20 MG/100ML; MG/100ML; MG/100ML; MG/100ML
INJECTION, SOLUTION INTRAVENOUS
Status: COMPLETED
Start: 2023-03-20 | End: 2023-03-20

## 2023-03-20 RX ORDER — NIFEDIPINE 30 MG
30 TABLET, EXTENDED RELEASE ORAL EVERY 24 HOURS
Qty: 60 TABLET | Refills: 1 | Status: SHIPPED | OUTPATIENT
Start: 2023-03-21 | End: 2023-04-26

## 2023-03-20 RX ADMIN — IBUPROFEN 800 MG: 800 TABLET, FILM COATED ORAL at 12:57

## 2023-03-20 RX ADMIN — IBUPROFEN 800 MG: 800 TABLET, FILM COATED ORAL at 00:09

## 2023-03-20 RX ADMIN — IBUPROFEN 800 MG: 800 TABLET, FILM COATED ORAL at 19:03

## 2023-03-20 RX ADMIN — MAGNESIUM SULFATE HEPTAHYDRATE 2 G/HR: 40 INJECTION, SOLUTION INTRAVENOUS at 09:59

## 2023-03-20 RX ADMIN — NIFEDIPINE 30 MG: 30 TABLET, FILM COATED, EXTENDED RELEASE ORAL at 13:00

## 2023-03-20 RX ADMIN — SENNOSIDES AND DOCUSATE SODIUM 2 TABLET: 50; 8.6 TABLET ORAL at 19:03

## 2023-03-20 RX ADMIN — SODIUM CHLORIDE, POTASSIUM CHLORIDE, SODIUM LACTATE AND CALCIUM CHLORIDE 75 ML/HR: 600; 310; 30; 20 INJECTION, SOLUTION INTRAVENOUS at 03:21

## 2023-03-20 RX ADMIN — ACETAMINOPHEN 975 MG: 325 TABLET ORAL at 06:54

## 2023-03-20 RX ADMIN — POLYETHYLENE GLYCOL 3350 17 G: 17 POWDER, FOR SOLUTION ORAL at 08:42

## 2023-03-20 RX ADMIN — IBUPROFEN 800 MG: 800 TABLET, FILM COATED ORAL at 06:54

## 2023-03-20 RX ADMIN — ACETAMINOPHEN 975 MG: 325 TABLET ORAL at 00:09

## 2023-03-20 RX ADMIN — ACETAMINOPHEN 975 MG: 325 TABLET ORAL at 12:57

## 2023-03-20 RX ADMIN — SENNOSIDES AND DOCUSATE SODIUM 2 TABLET: 50; 8.6 TABLET ORAL at 08:42

## 2023-03-20 RX ADMIN — ACETAMINOPHEN 975 MG: 325 TABLET ORAL at 19:03

## 2023-03-20 ASSESSMENT — ACTIVITIES OF DAILY LIVING (ADL)
ADLS_ACUITY_SCORE: 22

## 2023-03-20 NOTE — PLAN OF CARE
Postpartum assessments WDL. Blood pressures have been stable with a few above 130 DBP , Pt denies headache, vision changes, SOB, RUQ pain. Reflexes diminished , no clonus. Magnesium Sulfate infusing at 2g/hr, no s/s toxicity. Pt is Breastfeeding/Pumping independntly and is attentive to infant cues. Reports good pain management with PO medications. Support person present. Will continue with plan of care.

## 2023-03-20 NOTE — PROGRESS NOTES
OB Postpartum Progress Note  POD#4 s/p PLTCS for Cat 2 RFD    S: Feeling well this morning. Pain well controlled. Tolerating PO without nausea or emesis. Voiding spontaneously. Passing some flatus. Ambulating without dizziness or lightheadedness. Breast feeding. Lochia light.    Denies headache, vision changes, CP, SOB, RUQ pain, increased edema.    O:  Patient Vitals for the past 24 hrs:   BP Temp Temp src Pulse Resp SpO2 Weight   03/20/23 0439 128/74 97.7  F (36.5  C) Oral 104 16 100 % --   03/20/23 0036 139/86 98  F (36.7  C) Oral 77 16 -- --   03/19/23 2156 135/81 -- -- 85 -- -- --   03/19/23 2018 (!) 144/85 97.8  F (36.6  C) Oral -- 16 -- --   03/19/23 1700 136/87 97.6  F (36.4  C) Oral 73 16 100 % --   03/19/23 1545 136/78 -- -- 64 12 100 % --   03/19/23 1515 (!) 140/87 -- -- -- 12 99 % --   03/19/23 1452 -- -- -- -- -- -- 92.6 kg (204 lb 1.6 oz)   03/19/23 1445 134/82 -- -- 73 16 98 % --   03/19/23 1440 137/82 -- -- 76 16 97 % --   03/19/23 1435 139/83 -- -- 76 16 98 % --   03/19/23 1430 131/82 -- -- 72 16 97 % --   03/19/23 1425 133/81 -- -- 71 16 99 % --   03/19/23 1420 (!) 132/98 -- -- 77 16 98 % --   03/19/23 1415 (!) 127/93 -- -- 76 16 99 % --   03/19/23 1255 (!) 157/86 -- -- 78 -- -- --   03/19/23 1150 134/79 -- -- 71 -- -- --   03/19/23 1049 137/82 -- -- -- -- -- --   03/19/23 0936 (!) 132/90 98  F (36.7  C) Oral 72 16 -- --     Gen: NAD  CV: RRR, normal S1 and S2, no m/g/r  Resp: CTAB, Non-labored breathing on room air  Abd: Soft, non-distended, appropriately tender, fundus firm below the umbilicus and non-tender  Inc: C/D/I - Steri-strips in place  Ext: Trace BLE edema; reflexes and clonus not assessed as patient was actively breastfeeding    UOP: 1500 mL since 0000    Weight: 92.6 kg (3/19)> AM pending    Labs:  Recent Results (from the past 24 hour(s))   CBC with platelets    Collection Time: 03/19/23 10:44 AM   Result Value Ref Range    WBC Count 9.5 4.0 - 11.0 10e3/uL    RBC Count 3.23 (L) 3.80 -  5.20 10e6/uL    Hemoglobin 10.6 (L) 11.7 - 15.7 g/dL    Hematocrit 32.1 (L) 35.0 - 47.0 %    MCV 99 78 - 100 fL    MCH 32.8 26.5 - 33.0 pg    MCHC 33.0 31.5 - 36.5 g/dL    RDW 13.0 10.0 - 15.0 %    Platelet Count 161 150 - 450 10e3/uL   Creatinine    Collection Time: 23 10:44 AM   Result Value Ref Range    Creatinine 0.72 0.51 - 0.95 mg/dL    GFR Estimate >90 >60 mL/min/1.73m2   AST    Collection Time: 23 10:44 AM   Result Value Ref Range    AST 43 (H) 10 - 35 U/L   ALT    Collection Time: 23 10:44 AM   Result Value Ref Range    ALT 37 (H) 10 - 35 U/L         A/P: Christian Nuñez is a 37 year old  who is POD#4 s/p PLTCS for Category 2 FHT remote from delivery. Pregnancy notable for gHTN, AMA, Rubella NI. Meeting post-operative goals but course complicated by preeclampsia with severe features; plan to continue IV magnesium through this afternoon and titrate BP medications.    Postpartum/Postop  - Pain: Continue scheduled acetaminophen, scheduled ibuprofen, and prn oxycodone  - Heme: Appropriate blood loss during surgery. No s/s of ongoing blood loss. Hgb 13.3> > 10.9.  - GI: Bowel regimen. PRN simethicone QID. PRN antiemetics.  - : s/p robin; voiding spontaneously  - PNC: Rh positive, Rubella nonimmune. MMR vaccine ordered for administration prior to discharge.  - Breast feeding  - Contraception: considering options, discussed POPs/Nexplanon/Depo prior to discharge vs OCPs or IUD at 6 weeks  - PPx: Encourage ambulation, IS, SCDs while confined to bed    SI PreE w/ SF (HA, vision changes)  - Symptoms resolved this morning  - Mild ALT elevation at admission now down-trending/stable (51>35>37), AST 20>43 otherwise HELLP labs wnl (last 3/19) - will continue to trend as indicated (AM pending)  - BP: normotensive to LMR    - D#2 Procardia XL 30 mg   - IV antihypertensives prn  - UOP adequate; continue strict Is&Os  - Continue IV magnesium x24 hours for seizure prophylaxis (started 1408  3/20)    Dispo: anticipate POD#5 after completion of IV magnesium therapy, pending BP control    Lindsey Gu MD, PGY-2  6:51 AM  [facility]        Attestation:   This patient was seen and evaluated by me, separately from the house staff team. I have reviewed the note/plan above and agree.     Discussed magnesium off this afternoon and then need to watch at least 12 hours on nifedipine. Possible home tomorrow am if BP's are stable. Has her own BP cuff at home. No questions.    Loli Oro MD

## 2023-03-20 NOTE — PROGRESS NOTES
OB Magnesium Progress Note     S: Feeling well. Wishes she was at home but understands why she's here. Had some nasal congestion-feeling headache but no overt headache. No other vision changes, CP, SOB, RUQ pain, increased edema.    O:  Patient Vitals for the past 24 hrs:   BP Temp Temp src Pulse Resp SpO2 Weight   03/20/23 0833 131/85 98.2  F (36.8  C) Oral 93 16 99 % --   03/20/23 0650 -- -- -- -- -- -- 92.6 kg (204 lb 4 oz)   03/20/23 0439 128/74 97.7  F (36.5  C) Oral 104 16 100 % --   03/20/23 0036 139/86 98  F (36.7  C) Oral 77 16 -- --   03/19/23 2156 135/81 -- -- 85 -- -- --   03/19/23 2018 (!) 144/85 97.8  F (36.6  C) Oral -- 16 -- --   03/19/23 1700 136/87 97.6  F (36.4  C) Oral 73 16 100 % --   03/19/23 1545 136/78 -- -- 64 12 100 % --   03/19/23 1515 (!) 140/87 -- -- -- 12 99 % --   03/19/23 1452 -- -- -- -- -- -- 92.6 kg (204 lb 1.6 oz)   03/19/23 1445 134/82 -- -- 73 16 98 % --   03/19/23 1440 137/82 -- -- 76 16 97 % --   03/19/23 1435 139/83 -- -- 76 16 98 % --   03/19/23 1430 131/82 -- -- 72 16 97 % --   03/19/23 1425 133/81 -- -- 71 16 99 % --   03/19/23 1420 (!) 132/98 -- -- 77 16 98 % --   03/19/23 1415 (!) 127/93 -- -- 76 16 99 % --   03/19/23 1255 (!) 157/86 -- -- 78 -- -- --   03/19/23 1150 134/79 -- -- 71 -- -- --   03/19/23 1049 137/82 -- -- -- -- -- --     Gen: NAD  CV: RRR, normal S1 and S2, no m/g/r  Resp: CTAB, Non-labored breathing on room air  Abd: Soft, non-distended  Ext: Trace BLE edema; reflexes 2+ patellar     UOP:   Intake/Output Summary (Last 24 hours) at 3/20/2023 1046  Last data filed at 3/20/2023 0831  Gross per 24 hour   Intake 2500 ml   Output 6710 ml   Net -4210 ml       Weight: 92.6 kg (3/19)> 92.6 kg (3/20)    Labs:  Recent Results (from the past 24 hour(s))   CBC with platelets    Collection Time: 03/20/23  7:53 AM   Result Value Ref Range    WBC Count 8.4 4.0 - 11.0 10e3/uL    RBC Count 3.63 (L) 3.80 - 5.20 10e6/uL    Hemoglobin 11.8 11.7 - 15.7 g/dL    Hematocrit 35.6  35.0 - 47.0 %    MCV 98 78 - 100 fL    MCH 32.5 26.5 - 33.0 pg    MCHC 33.1 31.5 - 36.5 g/dL    RDW 12.8 10.0 - 15.0 %    Platelet Count 192 150 - 450 10e3/uL   Creatinine    Collection Time: 23  7:53 AM   Result Value Ref Range    Creatinine 0.58 0.51 - 0.95 mg/dL    GFR Estimate >90 >60 mL/min/1.73m2   AST    Collection Time: 23  7:53 AM   Result Value Ref Range    AST 84 (H) 10 - 35 U/L   ALT    Collection Time: 23  7:53 AM   Result Value Ref Range    ALT 85 (H) 10 - 35 U/L         A/P: Christian Nuñez is a 37 year old  who is POD#4 s/p PLTCS for Category 2 FHT remote from delivery. Pregnancy notable for gHTN, AMA, Rubella NI. Meeting post-operative goals but course complicated by preeclampsia with severe features; plan to continue IV magnesium through this afternoon and titrate BP medications.    SI PreE w/ SF (HA, vision changes)  - Symptoms resolved this morning, no s/s of mag toxicity at this time.   -Uptitrating LFTs this AM. With ALT 51> 35>37>85; AST 20>43>84 o/w wnl   -repeat HELLP ordered for 1400   - BP: normotensive today     - D#2 Procardia XL 30 mg -- dosed for 1400    - IV antihypertensives prn  - UOP adequate; continue strict Is&Os  - Continue IV magnesium x24 hours for seizure prophylaxis (started 1408 3/20)    Lindsey Nieto MD MPH  OB/Gyn Resident PGY-2  3/20/2023  10:45 AM

## 2023-03-20 NOTE — DISCHARGE SUMMARY
[facility] Discharge Summary    Christian Nuñez MRN# [id number]   Age: 37 year old YOB: 1985     Date of Admission:  3/16/2023  Date of Discharge:  3/21/2023  Admitting Physician:  Aracelis Buchanan MD  Discharge Physician:  Linda Dalton MD    Admit Dx:   - , Intrauterine pregnancy at 39w1d  - Gestational hypertension  - Advanced maternal age   - Rubella nonimmune    Discharge Dx:  - Same as above, now  s/p below stated procedures  - Pre-eclampsia with severe features (HA, vision changes)  - headache, resolved   - vision changes, resolved  - Mild ALT and AST elevation  - Cat 2 RFD    Procedures:  - Primary low transverse  section with two layer uterine closure via Pfannenstiel incision  - Spinal anesthesia   - Magnesium sulfate infusion     Consults:  - Anesthesia    Admit HPI:  Christian Nuñez is a 37 year old  at 39w1d by 9w US admitted for IOL for gHTN.    Please see her admit H&P for full details of her PMH, PSH, Meds, Allergies and exam on admit.    Intrapartum Course:   Christian Nuñez is a 37 year old  at 39w1d admitted for IOL in the setting of newly diagnosed gestational hypertension.  Cervix closed on presentation but patient mercedes frequently, so induction began with Cervidil, which was removed in the setting of a prolonged deceleration.  Switched to PO misoprostol for cervical ripening but with recurrence of prolonged decelerations, ultimately warranting urgent  delivery.  The risks, benefits, and alternatives of  section were discussed with the patient, and she agreed to proceed.     Operative Course:  Surgery was uncomplicated. EBL from the delivery was 288cc. Please see her  Section Operative Note for full details regarding her delivery.    Operative Findings:   1. No adhesions.  2. Clear amniotic fluid.  3. Liveborn male infant in ROT presentation. Apgars 9 at 1 minute & 9 at 5 minutes.  Weight 3.062 kg.  4. Small hematoma approximately 1 x 2 cm in size noted on the inferior/right aspect of the hysterotomy, not expanding in size at the time of closure.   5. Subserosal fibroid (presumed FIGO Grade 6) about 2 cm in size seen on the anterior mid uterus, with tissue noted to be friable. Hemostasis achieved with electrocautery.  6. SURGIFLO placed over the above noted fibroid and hysterotomy.  7. Otherwise normal uterus, fallopian tubes, and ovaries.     Postoperative Course:    Her postoperative course was notable for the development of pre-eclampsia with SF but otherwise uncomplicated. On POD#3, she was meeting all of her postpartum goals and deemed stable for discharge, but developed new onset preeclampsia with severe features of mild headache and visual changes with mild range BP. HELLP labs rechecked and notable for increase in AST 20>43>84>64 and ALT 51>35>37>85>96. Completed Mg 24 for seizure prophylaxis, and stable on BP medications with improvement in visual changes and headache. At time of discharge, her blood pressures were in the normal range. No other vision changes, CP, SOB, RUQ pain, increased edema. She was voiding without difficulty, tolerating a regular diet without nausea and vomiting, her pain was well controlled on oral pain medicines, and her lochia was appropriate. Her hemoglobin prior to delivery was 13.3 and after delivery was 10.9. Her Rh status was positive and Rhogam was not indicated. MMR vaccine administered prior to discharge.     Discharge Medications:     Review of your medicines      START taking      Dose / Directions   acetaminophen 325 MG tablet  Commonly known as: TYLENOL  Used for:  delivery, delivered, current hospitalization      Dose: 650 mg  Take 2 tablets (650 mg) by mouth every 6 hours as needed for mild pain Start after Delivery.  Quantity: 100 tablet  Refills: 0     ibuprofen 600 MG tablet  Commonly known as: ADVIL/MOTRIN  Used for:   delivery, delivered, current hospitalization      Dose: 600 mg  Take 1 tablet (600 mg) by mouth every 6 hours as needed for moderate pain (4-6) Start after delivery  Quantity: 60 tablet  Refills: 0     NIFEdipine ER 30 MG 24 hr tablet  Commonly known as: ADALAT CC  Used for: Preeclampsia, severe, unspecified trimester      Dose: 30 mg  Take 1 tablet (30 mg) by mouth every 24 hours  Quantity: 60 tablet  Refills: 1     oxyCODONE 5 MG tablet  Commonly known as: ROXICODONE  Used for:  delivery, delivered, current hospitalization      Dose: 5 mg  Take 1 tablet (5 mg) by mouth every 4 hours as needed  Quantity: 15 tablet  Refills: 0     senna-docusate 8.6-50 MG tablet  Commonly known as: SENOKOT-S/PERICOLACE  Used for:  delivery, delivered, current hospitalization      Dose: 1 tablet  Take 1 tablet by mouth daily Start after delivery.  Quantity: 100 tablet  Refills: 0        CONTINUE these medicines which have NOT CHANGED      Dose / Directions   ALPRAZolam 0.5 MG tablet  Commonly known as: XANAX      Dose: 0.5 mg  Take 0.5 mg by mouth daily as needed for anxiety  Refills: 0     amphetamine-dextroamphetamine 15 MG 24 hr capsule  Commonly known as: ADDERALL XR  Used for: Attention deficit hyperactivity disorder (ADHD), unspecified ADHD type      Dose: 15 mg  Take 1 capsule (15 mg) by mouth daily  Quantity: 30 capsule  Refills: 0     aspirin 81 MG chewable tablet  Commonly known as: ASA      Dose: 81 mg  Take 81 mg by mouth daily  Refills: 0     hydrOXYzine 25 MG tablet  Commonly known as: ATARAX  Used for: Anxiety      Dose: 25 mg  Take 1 tablet (25 mg) by mouth 3 times daily as needed for itching or anxiety  Quantity: 30 tablet  Refills: 1     PRENATAL VITAMIN PO      Refills: 0        STOP taking    azithromycin 250 MG tablet  Commonly known as: ZITHROMAX        benzonatate 100 MG capsule  Commonly known as: TESSALON        cyclobenzaprine 10 MG tablet  Commonly known as: FLEXERIL              Where to  get your medicines      These medications were sent to [facility] [place], [place] - [address]  [address] [facility] [address], [facility] [address]    Phone: [phone]     acetaminophen 325 MG tablet    ibuprofen 600 MG tablet    NIFEdipine ER 30 MG 24 hr tablet    senna-docusate 8.6-50 MG tablet     Some of these will need a paper prescription and others can be bought over the counter. Ask your nurse if you have questions.    Bring a paper prescription for each of these medications    oxyCODONE 5 MG tablet       Discharge/Disposition:  Christian Nuñez was discharged to home in stable condition with the following instructions/medications:  1) Call for temperature > 100.4, bright red vaginal bleeding >1 pad an hour x 2 hours, foul smelling vaginal discharge, pain not controlled by usual oral pain meds, persistent nausea and vomiting not controlled on medications, drainage or redness from incision site  2) For feeding she decided to breast feed.  3) She was instructed to follow-up with her primary OB within 1 week for a BP check and in 6 weeks for a routine postpartum visit. Recommend lab follow up to monitor changes in liver enzymes.  4) Discharge activity:  No heavy lifting >15 lbs or strenuous activity for 6 weeks, pelvic rest for 6 weeks, no driving or operating machinery while on narcotics.    Katja Madison, MS3    Resident/Fellow Attestation   I, Lindsey Nieto MD, was present with the medical/ADOLFO student who participated in the service and in the documentation of the note.  I have verified the history and personally performed the physical exam and medical decision making.  I agree with the assessment and plan of care as documented in the note.      Lindsey Nieto MD  PGY2  Date of Service (when I saw the patient): 03/21/23

## 2023-03-20 NOTE — LACTATION NOTE
This note was copied from a baby's chart.  Follow Up Consult    Maternal Assessment: Christian was started on Mag. Sulfate for newly diagnosed preeclampsia yesterday.     Infant Assessment: Gained weight from yesterday with supplementation. No documented output overnight but taking 20ml donor milk with each feeding.     Weight Change Since Birth: -8.5%     Feeding History: breastfeeding and supplementing with donor milk via bottle. Christian is pumping with feedings. She shares she is easily able to latch Pio and she is hearing him swallow more at the breast. She feels she is pumping a little more when she pumps today but is unsure of the volume.     Parents are continuing to supplement per peds recommendations due to weight loss of almost 10% at 48 hours of age.      Education: feeding frequency, pumping recommendations, indications for supplement, outpatient donor milk and outpatient lactation resources.     Plan: Continue breastfeeding on cue with RN support as needed with a goal of 8-12 feedings per day. Continue supplementation as recommended by pediatrician. Continue pumping while supplementing.    Family plans to follow up at [facility]. Encouraged follow up with outpatient lactation consultant within 1 week of discharge due to weight loss and supplementation.       Ada Junior RN, IBCLC  Lactation Consultant  Ascom: *[phone]  Office: [phone]

## 2023-03-20 NOTE — PLAN OF CARE
Goal Outcome Evaluation: VSS. BPs WDL. +2 reflexes, no clonus. C/o headache this morning but resolved by this afternoon. Denies blurry vision, epigastric pain, or SOB. Magnesium discontinued at 1400 per orders. IV SL. Incision LUCAS with steri-strips in place. Pain managed with tylenol and ibuprofen. Declined oxycodone this shift. Breastfeeding well and pumping to supplement infant.

## 2023-03-21 VITALS
BODY MASS INDEX: 31.61 KG/M2 | HEART RATE: 87 BPM | TEMPERATURE: 97.8 F | HEIGHT: 67 IN | OXYGEN SATURATION: 99 % | SYSTOLIC BLOOD PRESSURE: 148 MMHG | DIASTOLIC BLOOD PRESSURE: 90 MMHG | WEIGHT: 201.4 LBS | RESPIRATION RATE: 14 BRPM

## 2023-03-21 LAB
ALT SERPL W P-5'-P-CCNC: 96 U/L (ref 10–35)
AST SERPL W P-5'-P-CCNC: 64 U/L (ref 10–35)
CREAT SERPL-MCNC: 0.65 MG/DL (ref 0.51–0.95)
ERYTHROCYTE [DISTWIDTH] IN BLOOD BY AUTOMATED COUNT: 13 % (ref 10–15)
GFR SERPL CREATININE-BSD FRML MDRD: >90 ML/MIN/1.73M2
HCT VFR BLD AUTO: 34.8 % (ref 35–47)
HGB BLD-MCNC: 11.4 G/DL (ref 11.7–15.7)
MCH RBC QN AUTO: 32.5 PG (ref 26.5–33)
MCHC RBC AUTO-ENTMCNC: 32.8 G/DL (ref 31.5–36.5)
MCV RBC AUTO: 99 FL (ref 78–100)
PLATELET # BLD AUTO: 229 10E3/UL (ref 150–450)
RBC # BLD AUTO: 3.51 10E6/UL (ref 3.8–5.2)
WBC # BLD AUTO: 8.5 10E3/UL (ref 4–11)

## 2023-03-21 PROCEDURE — 90707 MMR VACCINE SC: CPT

## 2023-03-21 PROCEDURE — 250N000013 HC RX MED GY IP 250 OP 250 PS 637

## 2023-03-21 PROCEDURE — 84450 TRANSFERASE (AST) (SGOT): CPT

## 2023-03-21 PROCEDURE — 90471 IMMUNIZATION ADMIN: CPT

## 2023-03-21 PROCEDURE — 84460 ALANINE AMINO (ALT) (SGPT): CPT

## 2023-03-21 PROCEDURE — 250N000011 HC RX IP 250 OP 636

## 2023-03-21 PROCEDURE — 85027 COMPLETE CBC AUTOMATED: CPT

## 2023-03-21 PROCEDURE — 82565 ASSAY OF CREATININE: CPT

## 2023-03-21 PROCEDURE — 36415 COLL VENOUS BLD VENIPUNCTURE: CPT

## 2023-03-21 RX ADMIN — SENNOSIDES AND DOCUSATE SODIUM 2 TABLET: 50; 8.6 TABLET ORAL at 08:28

## 2023-03-21 RX ADMIN — MEASLES, MUMPS, AND RUBELLA VIRUS VACCINE LIVE 0.5 ML: 1000; 12500; 1000 INJECTION, POWDER, LYOPHILIZED, FOR SUSPENSION SUBCUTANEOUS at 12:20

## 2023-03-21 RX ADMIN — NIFEDIPINE 30 MG: 30 TABLET, FILM COATED, EXTENDED RELEASE ORAL at 11:52

## 2023-03-21 RX ADMIN — IBUPROFEN 800 MG: 800 TABLET, FILM COATED ORAL at 01:35

## 2023-03-21 RX ADMIN — ACETAMINOPHEN 975 MG: 325 TABLET ORAL at 01:35

## 2023-03-21 RX ADMIN — IBUPROFEN 800 MG: 800 TABLET, FILM COATED ORAL at 08:28

## 2023-03-21 RX ADMIN — ACETAMINOPHEN 975 MG: 325 TABLET ORAL at 08:28

## 2023-03-21 ASSESSMENT — ACTIVITIES OF DAILY LIVING (ADL)
ADLS_ACUITY_SCORE: 22

## 2023-03-21 NOTE — PLAN OF CARE
Goal Outcome Evaluation: Postpartum stable. Denies pre-e sx, has a mild HA but pt says its more sinus. Reflexes are +2, no clonus. Incision is LUCAS with steri strips. Pain is managed with tylenol and ibuprofen. Pumping and breastfeeding . Spouse in room for support. Continue POC.      Plan of Care Reviewed With: patient, spouse    Overall Patient Progress: improvingOverall Patient Progress: improving

## 2023-03-21 NOTE — PROGRESS NOTES
OB Postpartum Progress Note  POD#5 s/p PLTCS for Cat 2 RFD    S: Feeling well this morning. Pain well controlled. Tolerating PO without nausea or emesis. Voiding spontaneously. Passing some flatus. Ambulating without dizziness or lightheadedness. Breast feeding. Lochia light.    Denies headache, vision changes, CP, SOB, RUQ pain, increased edema.    O:  Patient Vitals for the past 24 hrs:   BP Temp Temp src Pulse Resp SpO2 Weight   03/21/23 0430 121/77 98.2  F (36.8  C) Oral -- 16 -- --   03/20/23 2330 127/72 98.1  F (36.7  C) Oral -- 16 -- --   03/20/23 1908 136/88 98  F (36.7  C) Oral -- 16 -- --   03/20/23 1900 (!) 142/89 -- -- -- 16 -- --   03/20/23 1600 135/79 98  F (36.7  C) Oral 87 16 -- --   03/20/23 1237 121/78 -- -- 83 16 99 % --   03/20/23 0833 131/85 98.2  F (36.8  C) Oral 93 16 99 % --   03/20/23 0650 -- -- -- -- -- -- 92.6 kg (204 lb 4 oz)     Gen: NAD  CV: RR, well perfused  Resp: Non-labored breathing on room air  Abd: Soft, non-distended, appropriately tender, fundus firm below the umbilicus and non-tender  Inc: C/D/I - Steri-strips in place  Ext: Trace BLE edema    UOP: 400 mL + 1x unmeasured void since 0000    Weight: 92.6 kg (3/19)> AM pending    Labs:  Recent Results (from the past 24 hour(s))   CBC with platelets    Collection Time: 03/20/23  7:53 AM   Result Value Ref Range    WBC Count 8.4 4.0 - 11.0 10e3/uL    RBC Count 3.63 (L) 3.80 - 5.20 10e6/uL    Hemoglobin 11.8 11.7 - 15.7 g/dL    Hematocrit 35.6 35.0 - 47.0 %    MCV 98 78 - 100 fL    MCH 32.5 26.5 - 33.0 pg    MCHC 33.1 31.5 - 36.5 g/dL    RDW 12.8 10.0 - 15.0 %    Platelet Count 192 150 - 450 10e3/uL   Creatinine    Collection Time: 03/20/23  7:53 AM   Result Value Ref Range    Creatinine 0.58 0.51 - 0.95 mg/dL    GFR Estimate >90 >60 mL/min/1.73m2   AST    Collection Time: 03/20/23  7:53 AM   Result Value Ref Range    AST 84 (H) 10 - 35 U/L   ALT    Collection Time: 03/20/23  7:53 AM   Result Value Ref Range    ALT 85 (H) 10 - 35  U/L         A/P: Christian Nuñez is a 37 year old  who is POD#5 s/p PLTCS for Category 2 FHT remote from delivery. Pregnancy notable for gHTN, AMA, Rubella NI. Meeting post-operative goals but course complicated by preeclampsia with severe features; now s/p IV magnesium, stable on current BP medications.    Postpartum/Postop  - Pain: Continue scheduled acetaminophen, scheduled ibuprofen, and prn oxycodone  - Heme: Appropriate blood loss during surgery. No s/s of ongoing blood loss. Hgb 13.3> > 10.9> 11.8.  - GI: Bowel regimen. PRN simethicone QID. PRN antiemetics.  - : s/p robin; voiding spontaneously  - PNC: Rh positive, Rubella nonimmune. MMR vaccine ordered for administration prior to discharge.  - Breast feeding  - Contraception: considering options, discussed POPs/Nexplanon/Depo prior to discharge vs OCPs or IUD at 6 weeks  - PPx: Encourage ambulation, IS, SCDs while confined to bed    SI PreE w/ SF (HA, vision changes)  - Symptoms resolved this morning  - Mild transaminase elevation at admission, initally down-trending/stable, then increased again POD#4, will repeat this AM   - ALT: 51> 35> 37> 85> AM pending   - AST: 27> 29> 43> 84> AM pending  - otherwise HELLP labs wnl (last 3/20)  - BP: normotensive to LMR   - D#3 Procardia XL 30 mg, plan to discharge on this dose   - IV antihypertensives prn  - UOP adequate; continue strict Is&Os  - Continue IV magnesium x24 hours for seizure prophylaxis (started 1408 3/20)    Dispo: anticipate home today POD#5 pending AM LFTs &continued BP control    Lindsey Gu MD, PGY-2  6:06 AM  [facility] Obstetrics & Gynecology Residency

## 2023-03-21 NOTE — CARE PLAN
Parents tired as baby has been irritable and not sleeping. Writer offered to care for baby while they sleep. Writer gave baby  10cc of expressed breastmilk at 0230, baby fell asleep after feed. Parents consented to giving donor milk if baby needs to feed. Baby with writer at nursing station form [phone]. Writer encouraged feeding or pumping at this time. Patient agreeable to breastfeeding.

## 2023-03-21 NOTE — PLAN OF CARE
Goal Outcome Evaluation:    Data: Vital signs stable, assessments within normal limits. Discharge instruction given and instructed of signs/symptoms to look for and report per discharge instructions.   Discharge outcomes on care plan met. No apparent pain.   Action: Review of care plan, teaching, and discharge instructions done with patient. verification with signature obtained.   Response: patient  states understanding and comfort with self cares. All questions about self care addressed. patient discharged at 1222..

## 2023-03-21 NOTE — CARE PLAN
Patient's VSS, normotensive. Denies headache, epigastric pain, visual disturbances, any increase in swelling. No clonus, reflexes 2+. Bleeding scant, uterus below umbilicus. Incision open to air with steristrips, some bruising around incisional site, no redness, swelling or discharge noted. Patient diuresing well, and drinking oral fluids. Waiting for morning pre/e labs. Patient having difficulty with latch, writer provided education, improvement noted.

## 2023-03-22 ENCOUNTER — TELEPHONE (OUTPATIENT)
Dept: OBGYN | Facility: CLINIC | Age: 38
End: 2023-03-22

## 2023-03-22 DIAGNOSIS — O14.20 HELLP (HEMOLYTIC ANEMIA/ELEV LIVER ENZYMES/LOW PLATELETS IN PREGNANCY): Primary | ICD-10-CM

## 2023-03-22 NOTE — TELEPHONE ENCOUNTER
----- Message from Linda Dalton MD sent at 3/21/2023  8:52 AM CDT -----  Regarding: Follow up  Please call Christian to schedule post-partum follow up. She should have an office visit this Friday 3/24 for a BP check and repeat HELLP labs. OK to double book onto my schedule or schedule with on call doctor.     She will also need a 6 week PP visit at 6 weeks and would also like to see Brenda for lactation as soon as possible.     Thank you!  Linda Dalton MD

## 2023-03-24 ENCOUNTER — OFFICE VISIT (OUTPATIENT)
Dept: OBGYN | Facility: CLINIC | Age: 38
End: 2023-03-24
Payer: COMMERCIAL

## 2023-03-24 VITALS
WEIGHT: 200 LBS | BODY MASS INDEX: 31.32 KG/M2 | SYSTOLIC BLOOD PRESSURE: 124 MMHG | HEART RATE: 76 BPM | DIASTOLIC BLOOD PRESSURE: 64 MMHG | OXYGEN SATURATION: 97 %

## 2023-03-24 LAB
ALT SERPL W P-5'-P-CCNC: 75 U/L (ref 10–35)
AST SERPL W P-5'-P-CCNC: 37 U/L (ref 10–35)
CREAT SERPL-MCNC: 0.82 MG/DL (ref 0.51–0.95)
ERYTHROCYTE [DISTWIDTH] IN BLOOD BY AUTOMATED COUNT: 13 % (ref 10–15)
GFR SERPL CREATININE-BSD FRML MDRD: >90 ML/MIN/1.73M2
HCT VFR BLD AUTO: 37.3 % (ref 35–47)
HGB BLD-MCNC: 12.2 G/DL (ref 11.7–15.7)
MCH RBC QN AUTO: 32.9 PG (ref 26.5–33)
MCHC RBC AUTO-ENTMCNC: 32.7 G/DL (ref 31.5–36.5)
MCV RBC AUTO: 101 FL (ref 78–100)
PLATELET # BLD AUTO: 300 10E3/UL (ref 150–450)
RBC # BLD AUTO: 3.71 10E6/UL (ref 3.8–5.2)
WBC # BLD AUTO: 8 10E3/UL (ref 4–11)

## 2023-03-24 PROCEDURE — 36415 COLL VENOUS BLD VENIPUNCTURE: CPT | Performed by: OBSTETRICS & GYNECOLOGY

## 2023-03-24 PROCEDURE — 99207 PR PRENATAL VISIT: CPT | Performed by: OBSTETRICS & GYNECOLOGY

## 2023-03-24 PROCEDURE — 85027 COMPLETE CBC AUTOMATED: CPT | Performed by: OBSTETRICS & GYNECOLOGY

## 2023-03-24 PROCEDURE — 84450 TRANSFERASE (AST) (SGOT): CPT | Performed by: OBSTETRICS & GYNECOLOGY

## 2023-03-24 PROCEDURE — 82565 ASSAY OF CREATININE: CPT | Performed by: OBSTETRICS & GYNECOLOGY

## 2023-03-24 PROCEDURE — 84460 ALANINE AMINO (ALT) (SGPT): CPT | Performed by: OBSTETRICS & GYNECOLOGY

## 2023-03-24 NOTE — PROGRESS NOTES
SUBJECTIVE:  Christian is a 37-year-old P1 001 who is postoperative day 8 from a  section.  Her pregnancy was complicated by HELLP syndrome and she is here today for blood pressure check and follow-up labs.    Postoperatively she is doing well.  She is struggling with breast-feeding and plans to make an appointment with the lactation specialist.    She is on nifedipine 30 mg.  Blood pressures at home run 120s to 140s / 70s to 95    Swelling has improved.  Emotionally she feels overwhelmed at times and teary but overall doing well.    Baby is doing well.      OBJECTIVE:  /64   Pulse 76   Wt 90.7 kg (200 lb)   LMP 2022   SpO2 97%   Breastfeeding Yes   BMI 31.32 kg/m       General - pleasant female in no acute distress.  Neurological - alert and oriented X 3  Psychiatric - normal mood and affect  The abdomen is soft without tenderness, guarding, mass, rebound or organomegaly.    Incision well healed         ASSESSMENT/PLAN:  (Z39.2) Postpartum exam  (primary encounter diagnosis)  Comment: Doing well postoperatively.  Plan: Reviewed normal postoperative and postpartum expectations.  Discussed goal blood pressure would be under 140s over 90s.  She should continue on her nifedipine.  Discussed potential side effects if her blood pressure drops too low.  She should call if blood pressures reach 150- 160/110 range.      (O14.20) HELLP (hemolytic anemia/elev liver enzymes/low platelets in pregnancy)  Comment: ALT and AST were elevated in the hospital.  Plan: Repeating labs today.  Patient has her 6-week postpartum visit scheduled and will follow-up sooner as needed for elevated blood pressures, or symptoms of depression.    Yu Cooper MD

## 2023-04-19 ENCOUNTER — MYC MEDICAL ADVICE (OUTPATIENT)
Dept: OBGYN | Facility: CLINIC | Age: 38
End: 2023-04-19
Payer: COMMERCIAL

## 2023-04-19 DIAGNOSIS — O14.10 PREECLAMPSIA, SEVERE, UNSPECIFIED TRIMESTER: ICD-10-CM

## 2023-04-20 RX ORDER — NIFEDIPINE 30 MG
30 TABLET, EXTENDED RELEASE ORAL EVERY 24 HOURS
Qty: 60 TABLET | Refills: 1 | Status: CANCELLED | OUTPATIENT
Start: 2023-04-20

## 2023-04-20 NOTE — TELEPHONE ENCOUNTER
I need to know her actual BP numbers to make a decision if she should continue the medication or not.  If they are persistently <120/80 then we would stop nifedipine.  If the are persistently high 130's/high 80's I would continue it.    Thanks,  Niki

## 2023-04-20 NOTE — CONFIDENTIAL NOTE
"Pt wans to know if she should continue BP meds or discontinue--continue?  Pt said she's had intermittent headaches but might be due to dehydration.   Asked about BP log and pt states they have been \"in normal rang and have leveled out'  Routing to provider to advise.  Fadumo Tobin RN on 4/20/2023 at 10:16 AM    "

## 2023-04-26 ENCOUNTER — PRENATAL OFFICE VISIT (OUTPATIENT)
Dept: OBGYN | Facility: CLINIC | Age: 38
End: 2023-04-26
Payer: COMMERCIAL

## 2023-04-26 VITALS
HEART RATE: 62 BPM | BODY MASS INDEX: 31.31 KG/M2 | SYSTOLIC BLOOD PRESSURE: 116 MMHG | DIASTOLIC BLOOD PRESSURE: 77 MMHG | WEIGHT: 199.9 LBS

## 2023-04-26 DIAGNOSIS — Z30.011 ENCOUNTER FOR INITIAL PRESCRIPTION OF CONTRACEPTIVE PILLS: ICD-10-CM

## 2023-04-26 PROBLEM — Z23 NEED FOR TDAP VACCINATION: Status: RESOLVED | Noted: 2022-08-03 | Resolved: 2023-04-26

## 2023-04-26 PROBLEM — Z34.03 ENCOUNTER FOR SUPERVISION OF NORMAL FIRST PREGNANCY IN THIRD TRIMESTER: Status: RESOLVED | Noted: 2023-03-16 | Resolved: 2023-04-26

## 2023-04-26 PROCEDURE — 99207 PR POST PARTUM EXAM: CPT | Performed by: OBSTETRICS & GYNECOLOGY

## 2023-04-26 RX ORDER — LEVONORGESTREL AND ETHINYL ESTRADIOL 6-5-10
1 KIT ORAL DAILY
Qty: 90 TABLET | Refills: 4 | Status: SHIPPED | OUTPATIENT
Start: 2023-04-26 | End: 2024-07-08

## 2023-04-26 ASSESSMENT — PATIENT HEALTH QUESTIONNAIRE - PHQ9: SUM OF ALL RESPONSES TO PHQ QUESTIONS 1-9: 2

## 2023-04-26 NOTE — PROGRESS NOTES
Christian is here for a 6-week postpartum checkup.    She had a c/s for abnormal FHT of a viable boy, weight 6 pounds 12 oz., after IOL for preeclampsia with SF.  Since delivery, she has been pumping but has never had adequate supply, so has decided to stop in the last few days.  She has no signs of infection, bleeding or other complications.  She is not pregnant.  We discussed contraceptions and she has chosen oral contraceptives.  She was discharged on nifedipine, ran out a week or so ago and stopped, bps have been <140/90 at home.    Overall her mood is ok.  She is feeling a little low because of body image and really wants to get back into exercise which always helps her mood.  She also is going to restart her adderall.        4/26/2023     3:41 PM   PHQ   PHQ-9 Total Score 2   Q9: Thoughts of better off dead/self-harm past 2 weeks Not at all       EXAM:    HEENT: grossly normal.  NECK: no lymphadenopathy or thyroidomegaly.  LUNGS: CTA X 2, no rales or crackles.  BACK: No spinal or CVA tenderness.  HEART: RRR without murmurs clicks or gallops.  ABDOMEN: soft, non tender, good bowel sounds, without masses rebound, guarding or tenderness. Incision well healed.    PELVIC:    deferred    EXTREMITIES: Warm to touch, good pulses, no ankle edema or calf tenderness.  NEUROLOGIC: grossly normal.    ASSESSMENT:   Normal 6-week postpartum exam after c/s for abnormal FHT. Preeclampsia with SF with resumption of normal BP    PLAN:  Pap smear utd and oral contraceptives for contraception. Ok to resume normal activities. RTC yearly or prn.    KETTY PAZ MD

## 2023-07-21 ENCOUNTER — OFFICE VISIT (OUTPATIENT)
Dept: FAMILY MEDICINE | Facility: CLINIC | Age: 38
End: 2023-07-21
Payer: COMMERCIAL

## 2023-07-21 VITALS
RESPIRATION RATE: 16 BRPM | HEART RATE: 75 BPM | DIASTOLIC BLOOD PRESSURE: 83 MMHG | OXYGEN SATURATION: 96 % | TEMPERATURE: 98.5 F | WEIGHT: 203.7 LBS | SYSTOLIC BLOOD PRESSURE: 124 MMHG | HEIGHT: 67 IN | BODY MASS INDEX: 31.97 KG/M2

## 2023-07-21 DIAGNOSIS — Z87.59 HISTORY OF PRE-ECLAMPSIA: ICD-10-CM

## 2023-07-21 DIAGNOSIS — F90.9 ATTENTION DEFICIT HYPERACTIVITY DISORDER (ADHD), UNSPECIFIED ADHD TYPE: Primary | ICD-10-CM

## 2023-07-21 PROCEDURE — 99213 OFFICE O/P EST LOW 20 MIN: CPT | Performed by: PHYSICIAN ASSISTANT

## 2023-07-21 RX ORDER — DEXTROAMPHETAMINE SACCHARATE, AMPHETAMINE ASPARTATE MONOHYDRATE, DEXTROAMPHETAMINE SULFATE AND AMPHETAMINE SULFATE 3.75; 3.75; 3.75; 3.75 MG/1; MG/1; MG/1; MG/1
15 CAPSULE, EXTENDED RELEASE ORAL DAILY
Qty: 30 CAPSULE | Refills: 0 | Status: SHIPPED | OUTPATIENT
Start: 2023-09-21 | End: 2023-10-21

## 2023-07-21 RX ORDER — DEXTROAMPHETAMINE SACCHARATE, AMPHETAMINE ASPARTATE MONOHYDRATE, DEXTROAMPHETAMINE SULFATE AND AMPHETAMINE SULFATE 3.75; 3.75; 3.75; 3.75 MG/1; MG/1; MG/1; MG/1
15 CAPSULE, EXTENDED RELEASE ORAL DAILY
Qty: 30 CAPSULE | Refills: 0 | Status: SHIPPED | OUTPATIENT
Start: 2023-07-21 | End: 2023-08-20

## 2023-07-21 RX ORDER — DEXTROAMPHETAMINE SACCHARATE, AMPHETAMINE ASPARTATE MONOHYDRATE, DEXTROAMPHETAMINE SULFATE AND AMPHETAMINE SULFATE 3.75; 3.75; 3.75; 3.75 MG/1; MG/1; MG/1; MG/1
15 CAPSULE, EXTENDED RELEASE ORAL DAILY
Qty: 30 CAPSULE | Refills: 0 | Status: SHIPPED | OUTPATIENT
Start: 2023-08-21 | End: 2023-09-20

## 2023-07-21 ASSESSMENT — PAIN SCALES - GENERAL: PAINLEVEL: NO PAIN (0)

## 2023-07-21 NOTE — PROGRESS NOTES
"Assessment & Plan     Attention deficit hyperactivity disorder (ADHD), unspecified ADHD type  Adderall 15 mg extended release restarted.  Provided her with a 3-month supply.  Plan for follow-up in 6 months for an annual physical.  Can follow-up sooner if she feels as though the dose needs to be adjusted or if she is having any other concerns.  Reviewed safety/side effects.  Option in the future to pursue treatment for anxiety/depression if her symptoms do not improve.  - amphetamine-dextroamphetamine (ADDERALL XR) 15 MG 24 hr capsule  Dispense: 30 capsule; Refill: 0  - amphetamine-dextroamphetamine (ADDERALL XR) 15 MG 24 hr capsule  Dispense: 30 capsule; Refill: 0  - amphetamine-dextroamphetamine (ADDERALL XR) 15 MG 24 hr capsule  Dispense: 30 capsule; Refill: 0    History of pre-eclampsia  Continue close follow-up with OB/GYN.  Use of  20 minutes spent by me on the date of the encounter doing chart review, review of test results, interpretation of tests, patient visit and documentation        BMI:   Estimated body mass index is 31.9 kg/m  as calculated from the following:    Height as of this encounter: 1.702 m (5' 7\").    Weight as of this encounter: 92.4 kg (203 lb 11.2 oz).   Weight management plan: Discussed healthy diet and exercise guidelines      Return in about 6 months (around 1/21/2024) for follow-up per plan.    The likelihood of other entities in the differential is insufficient to justify any further testing for them at this time. This was explained to the patient. The patient was advised that persistent or worsening symptoms would require further evaluation. Patient advised to call the office and if unable to reach to go to the emergency room if they develop any new or worsening symptoms. Expressed understanding and agreement with above stated plan.     Aj Heart PA-C  [facility] Nu[facility]ez is a 37 year old female presenting for the following health " "issues:  Patient presents with:  Recheck Medication    Here today to establish care as well as for a medication refill.  Previous patient of Prisca Troncoso PA-C.  4 months ago had a baby boy. Doing well.  Works as a therapist for adolescent boys.    -Requesting to restart her Adderall prescription.  Previously on 50 mg extended release daily.  Tolerated this medication well in the past.  Does note some fluctuations and has a history of anxiety/depression. However, noticed this is improved when she takes her Adderall.  Was on Lexapro previously.  Uses Xanax as needed especially for flying.    Has good support from family and friends.    Review of Systems   Constitutional, HEENT, cardiovascular, pulmonary, GI, , musculoskeletal, neuro, skin, endocrine and psych systems are negative, except as otherwise noted.      Objective    /83 (BP Location: Right arm, Patient Position: Sitting, Cuff Size: Adult Large)   Pulse 75   Temp 98.5  F (36.9  C) (Tympanic)   Resp 16   Ht 1.702 m (5' 7\")   Wt 92.4 kg (203 lb 11.2 oz)   LMP 07/17/2023 (Exact Date)   SpO2 96%   BMI 31.90 kg/m    5' 7\"  203 lbs 11.2 oz    Allergies   Allergen Reactions     Blood Transfusion Related (Informational Only) Other (See Comments)     Notified of delay in availability of crossmatched red blood cells due to positive antibody screen 8/25/22; negative antibody screeen 3/16/2023     Seasonal Allergies      Current Outpatient Medications   Medication Sig Dispense Refill     amphetamine-dextroamphetamine (ADDERALL XR) 15 MG 24 hr capsule Take 1 capsule (15 mg) by mouth daily for 30 days 30 capsule 0     [START ON 8/21/2023] amphetamine-dextroamphetamine (ADDERALL XR) 15 MG 24 hr capsule Take 1 capsule (15 mg) by mouth daily for 30 days 30 capsule 0     [START ON 9/21/2023] amphetamine-dextroamphetamine (ADDERALL XR) 15 MG 24 hr capsule Take 1 capsule (15 mg) by mouth daily for 30 days 30 capsule 0     levonorg-eth estrad triphasic 50-30/75-40/ " 125-30 MCG TABS Take 1 tablet by mouth daily 90 tablet 4     ALPRAZolam (XANAX) 0.5 MG tablet Take 0.5 mg by mouth daily as needed for anxiety (Patient not taking: Reported on 2023)       amphetamine-dextroamphetamine (ADDERALL XR) 15 MG 24 hr capsule Take 1 capsule (15 mg) by mouth daily (Patient not taking: Reported on 3/24/2023) 30 capsule 0     hydrOXYzine (ATARAX) 25 MG tablet Take 1 tablet (25 mg) by mouth 3 times daily as needed for itching or anxiety (Patient not taking: Reported on 3/24/2023) 30 tablet 1     Prenatal Vit-Fe Fumarate-FA (PRENATAL VITAMIN PO)  (Patient not taking: Reported on 2023)       Past Medical History:   Diagnosis Date     Anxiety      Depressive disorder 10/28/2022     Varicella      Past Surgical History:   Procedure Laterality Date      SECTION N/A 3/16/2023    Procedure:  section;  Surgeon: Aracelis Buchanan MD;  Location:  L+D     wisdom tooth removal         Physical Exam   EXAM:  GENERAL APPEARANCE: healthy, alert and no distress  EYES: Eyes grossly normal to inspection, PERRL and conjunctivae and sclerae normal  NECK: no asymmetry, masses, or scars  RESP: lungs clear to auscultation - no rales, rhonchi or wheezes  CV: regular rates and rhythm, normal S1 S2, no S3 or S4 and no murmur, click or rub  MS: extremities normal- no gross deformities noted  SKIN: no suspicious lesions or rashes  NEURO: Normal strength and tone, mentation intact and speech normal  PSYCH: mentation appears normal and affect normal      Answers for HPI/ROS submitted by the patient on 2023  What is the reason for your visit today? : Medixation Management  How many servings of fruits and vegetables do you eat daily?: 2-3  On average, how many sweetened beverages do you drink each day (Examples: soda, juice, sweet tea, etc.  Do NOT count diet or artificially sweetened beverages)?: 1  How many minutes a day do you exercise enough to make your heart beat faster?: 20 to  29  How many days a week do you exercise enough to make your heart beat faster?: 4  How many days per week do you miss taking your medication?: 0

## 2023-07-24 ENCOUNTER — MYC REFILL (OUTPATIENT)
Dept: FAMILY MEDICINE | Facility: CLINIC | Age: 38
End: 2023-07-24
Payer: COMMERCIAL

## 2023-07-24 DIAGNOSIS — F90.9 ATTENTION DEFICIT HYPERACTIVITY DISORDER (ADHD), UNSPECIFIED ADHD TYPE: ICD-10-CM

## 2023-07-24 RX ORDER — DEXTROAMPHETAMINE SACCHARATE, AMPHETAMINE ASPARTATE, DEXTROAMPHETAMINE SULFATE AND AMPHETAMINE SULFATE 3.75; 3.75; 3.75; 3.75 MG/1; MG/1; MG/1; MG/1
15 TABLET ORAL DAILY
Qty: 30 TABLET | Refills: 0 | Status: SHIPPED | OUTPATIENT
Start: 2023-09-24 | End: 2023-10-24

## 2023-07-24 RX ORDER — DEXTROAMPHETAMINE SACCHARATE, AMPHETAMINE ASPARTATE MONOHYDRATE, DEXTROAMPHETAMINE SULFATE AND AMPHETAMINE SULFATE 3.75; 3.75; 3.75; 3.75 MG/1; MG/1; MG/1; MG/1
15 CAPSULE, EXTENDED RELEASE ORAL DAILY
Qty: 30 CAPSULE | Refills: 0 | Status: CANCELLED | OUTPATIENT
Start: 2023-07-24

## 2023-07-24 RX ORDER — DEXTROAMPHETAMINE SACCHARATE, AMPHETAMINE ASPARTATE, DEXTROAMPHETAMINE SULFATE AND AMPHETAMINE SULFATE 3.75; 3.75; 3.75; 3.75 MG/1; MG/1; MG/1; MG/1
15 TABLET ORAL DAILY
Qty: 30 TABLET | Refills: 0 | Status: SHIPPED | OUTPATIENT
Start: 2023-08-24 | End: 2023-09-23

## 2023-07-24 RX ORDER — DEXTROAMPHETAMINE SACCHARATE, AMPHETAMINE ASPARTATE, DEXTROAMPHETAMINE SULFATE AND AMPHETAMINE SULFATE 3.75; 3.75; 3.75; 3.75 MG/1; MG/1; MG/1; MG/1
15 TABLET ORAL DAILY
Qty: 30 TABLET | Refills: 0 | Status: SHIPPED | OUTPATIENT
Start: 2023-07-24 | End: 2023-08-22

## 2023-08-13 ENCOUNTER — HEALTH MAINTENANCE LETTER (OUTPATIENT)
Age: 38
End: 2023-08-13

## 2023-08-22 ENCOUNTER — MYC REFILL (OUTPATIENT)
Dept: FAMILY MEDICINE | Facility: CLINIC | Age: 38
End: 2023-08-22
Payer: COMMERCIAL

## 2023-08-22 DIAGNOSIS — F90.9 ATTENTION DEFICIT HYPERACTIVITY DISORDER (ADHD), UNSPECIFIED ADHD TYPE: ICD-10-CM

## 2023-08-23 RX ORDER — DEXTROAMPHETAMINE SACCHARATE, AMPHETAMINE ASPARTATE, DEXTROAMPHETAMINE SULFATE AND AMPHETAMINE SULFATE 3.75; 3.75; 3.75; 3.75 MG/1; MG/1; MG/1; MG/1
15 TABLET ORAL DAILY
Qty: 30 TABLET | Refills: 0 | Status: SHIPPED | OUTPATIENT
Start: 2023-08-23 | End: 2023-09-24

## 2023-09-24 ENCOUNTER — MYC REFILL (OUTPATIENT)
Dept: FAMILY MEDICINE | Facility: CLINIC | Age: 38
End: 2023-09-24
Payer: COMMERCIAL

## 2023-09-24 DIAGNOSIS — F90.9 ATTENTION DEFICIT HYPERACTIVITY DISORDER (ADHD), UNSPECIFIED ADHD TYPE: ICD-10-CM

## 2023-09-25 RX ORDER — DEXTROAMPHETAMINE SACCHARATE, AMPHETAMINE ASPARTATE, DEXTROAMPHETAMINE SULFATE AND AMPHETAMINE SULFATE 3.75; 3.75; 3.75; 3.75 MG/1; MG/1; MG/1; MG/1
15 TABLET ORAL DAILY
Qty: 30 TABLET | Refills: 0 | Status: SHIPPED | OUTPATIENT
Start: 2023-09-25 | End: 2023-10-25

## 2023-10-25 ENCOUNTER — MYC REFILL (OUTPATIENT)
Dept: FAMILY MEDICINE | Facility: CLINIC | Age: 38
End: 2023-10-25
Payer: COMMERCIAL

## 2023-10-25 DIAGNOSIS — F90.9 ATTENTION DEFICIT HYPERACTIVITY DISORDER (ADHD), UNSPECIFIED ADHD TYPE: ICD-10-CM

## 2023-10-25 RX ORDER — DEXTROAMPHETAMINE SACCHARATE, AMPHETAMINE ASPARTATE, DEXTROAMPHETAMINE SULFATE AND AMPHETAMINE SULFATE 3.75; 3.75; 3.75; 3.75 MG/1; MG/1; MG/1; MG/1
15 TABLET ORAL DAILY
Qty: 30 TABLET | Refills: 0 | Status: SHIPPED | OUTPATIENT
Start: 2023-10-25 | End: 2023-12-08

## 2023-12-08 ENCOUNTER — MYC REFILL (OUTPATIENT)
Dept: FAMILY MEDICINE | Facility: CLINIC | Age: 38
End: 2023-12-08
Payer: COMMERCIAL

## 2023-12-08 DIAGNOSIS — F90.9 ATTENTION DEFICIT HYPERACTIVITY DISORDER (ADHD), UNSPECIFIED ADHD TYPE: ICD-10-CM

## 2023-12-11 RX ORDER — DEXTROAMPHETAMINE SACCHARATE, AMPHETAMINE ASPARTATE, DEXTROAMPHETAMINE SULFATE AND AMPHETAMINE SULFATE 3.75; 3.75; 3.75; 3.75 MG/1; MG/1; MG/1; MG/1
15 TABLET ORAL DAILY
Qty: 30 TABLET | Refills: 0 | Status: SHIPPED | OUTPATIENT
Start: 2023-12-11 | End: 2024-01-22

## 2024-01-22 ENCOUNTER — MYC REFILL (OUTPATIENT)
Dept: FAMILY MEDICINE | Facility: CLINIC | Age: 39
End: 2024-01-22
Payer: COMMERCIAL

## 2024-01-22 DIAGNOSIS — F90.9 ATTENTION DEFICIT HYPERACTIVITY DISORDER (ADHD), UNSPECIFIED ADHD TYPE: ICD-10-CM

## 2024-01-22 RX ORDER — DEXTROAMPHETAMINE SACCHARATE, AMPHETAMINE ASPARTATE, DEXTROAMPHETAMINE SULFATE AND AMPHETAMINE SULFATE 3.75; 3.75; 3.75; 3.75 MG/1; MG/1; MG/1; MG/1
15 TABLET ORAL DAILY
Qty: 30 TABLET | Refills: 0 | Status: SHIPPED | OUTPATIENT
Start: 2024-01-22 | End: 2024-02-26

## 2024-01-30 ASSESSMENT — ENCOUNTER SYMPTOMS
FREQUENCY: 0
DIZZINESS: 0
HEADACHES: 0
JOINT SWELLING: 0
FEVER: 0
PALPITATIONS: 0
ARTHRALGIAS: 0
HEMATURIA: 0
NERVOUS/ANXIOUS: 1
CHILLS: 0
NAUSEA: 0
SORE THROAT: 0
BREAST MASS: 0
WEAKNESS: 0
EYE PAIN: 0
HEARTBURN: 0
ABDOMINAL PAIN: 0
COUGH: 1
DIARRHEA: 0
DYSURIA: 0
SHORTNESS OF BREATH: 0
PARESTHESIAS: 1
HEMATOCHEZIA: 0
CONSTIPATION: 0
MYALGIAS: 0

## 2024-02-02 ENCOUNTER — OFFICE VISIT (OUTPATIENT)
Dept: FAMILY MEDICINE | Facility: CLINIC | Age: 39
End: 2024-02-02
Payer: COMMERCIAL

## 2024-02-02 VITALS
RESPIRATION RATE: 16 BRPM | HEART RATE: 84 BPM | WEIGHT: 187.2 LBS | OXYGEN SATURATION: 99 % | HEIGHT: 67 IN | TEMPERATURE: 99 F | BODY MASS INDEX: 29.38 KG/M2 | SYSTOLIC BLOOD PRESSURE: 120 MMHG | DIASTOLIC BLOOD PRESSURE: 81 MMHG

## 2024-02-02 DIAGNOSIS — Z00.00 ANNUAL PHYSICAL EXAM: Primary | ICD-10-CM

## 2024-02-02 DIAGNOSIS — F90.9 ATTENTION DEFICIT HYPERACTIVITY DISORDER (ADHD), UNSPECIFIED ADHD TYPE: ICD-10-CM

## 2024-02-02 DIAGNOSIS — L21.9 SEBORRHEIC DERMATITIS: ICD-10-CM

## 2024-02-02 DIAGNOSIS — Z13.6 CARDIOVASCULAR SCREENING; LDL GOAL LESS THAN 130: ICD-10-CM

## 2024-02-02 LAB
ALBUMIN SERPL BCG-MCNC: 4.2 G/DL (ref 3.5–5.2)
ALP SERPL-CCNC: 65 U/L (ref 40–150)
ALT SERPL W P-5'-P-CCNC: 34 U/L (ref 0–50)
ANION GAP SERPL CALCULATED.3IONS-SCNC: 10 MMOL/L (ref 7–15)
AST SERPL W P-5'-P-CCNC: 22 U/L (ref 0–45)
BASOPHILS # BLD AUTO: 0 10E3/UL (ref 0–0.2)
BASOPHILS NFR BLD AUTO: 1 %
BILIRUB SERPL-MCNC: 0.4 MG/DL
BUN SERPL-MCNC: 15.5 MG/DL (ref 6–20)
CALCIUM SERPL-MCNC: 9.6 MG/DL (ref 8.6–10)
CHLORIDE SERPL-SCNC: 102 MMOL/L (ref 98–107)
CHOLEST SERPL-MCNC: 238 MG/DL
CREAT SERPL-MCNC: 0.89 MG/DL (ref 0.51–0.95)
DEPRECATED HCO3 PLAS-SCNC: 24 MMOL/L (ref 22–29)
EGFRCR SERPLBLD CKD-EPI 2021: 85 ML/MIN/1.73M2
EOSINOPHIL # BLD AUTO: 0.1 10E3/UL (ref 0–0.7)
EOSINOPHIL NFR BLD AUTO: 2 %
ERYTHROCYTE [DISTWIDTH] IN BLOOD BY AUTOMATED COUNT: 13.1 % (ref 10–15)
FASTING STATUS PATIENT QL REPORTED: NO
GLUCOSE SERPL-MCNC: 94 MG/DL (ref 70–99)
HCT VFR BLD AUTO: 43.5 % (ref 35–47)
HDLC SERPL-MCNC: 68 MG/DL
HGB BLD-MCNC: 13.8 G/DL (ref 11.7–15.7)
IMM GRANULOCYTES # BLD: 0 10E3/UL
IMM GRANULOCYTES NFR BLD: 0 %
LDLC SERPL CALC-MCNC: 152 MG/DL
LYMPHOCYTES # BLD AUTO: 1.9 10E3/UL (ref 0.8–5.3)
LYMPHOCYTES NFR BLD AUTO: 30 %
MCH RBC QN AUTO: 30.9 PG (ref 26.5–33)
MCHC RBC AUTO-ENTMCNC: 31.7 G/DL (ref 31.5–36.5)
MCV RBC AUTO: 98 FL (ref 78–100)
MONOCYTES # BLD AUTO: 0.4 10E3/UL (ref 0–1.3)
MONOCYTES NFR BLD AUTO: 6 %
NEUTROPHILS # BLD AUTO: 4 10E3/UL (ref 1.6–8.3)
NEUTROPHILS NFR BLD AUTO: 62 %
NONHDLC SERPL-MCNC: 170 MG/DL
PLATELET # BLD AUTO: 208 10E3/UL (ref 150–450)
POTASSIUM SERPL-SCNC: 4.2 MMOL/L (ref 3.4–5.3)
PROT SERPL-MCNC: 7.3 G/DL (ref 6.4–8.3)
RBC # BLD AUTO: 4.46 10E6/UL (ref 3.8–5.2)
SODIUM SERPL-SCNC: 136 MMOL/L (ref 135–145)
TRIGL SERPL-MCNC: 92 MG/DL
TSH SERPL DL<=0.005 MIU/L-ACNC: 1.1 UIU/ML (ref 0.3–4.2)
WBC # BLD AUTO: 6.5 10E3/UL (ref 4–11)

## 2024-02-02 PROCEDURE — 90686 IIV4 VACC NO PRSV 0.5 ML IM: CPT | Performed by: PHYSICIAN ASSISTANT

## 2024-02-02 PROCEDURE — 36415 COLL VENOUS BLD VENIPUNCTURE: CPT | Performed by: PHYSICIAN ASSISTANT

## 2024-02-02 PROCEDURE — 85025 COMPLETE CBC W/AUTO DIFF WBC: CPT | Performed by: PHYSICIAN ASSISTANT

## 2024-02-02 PROCEDURE — 91320 SARSCV2 VAC 30MCG TRS-SUC IM: CPT | Performed by: PHYSICIAN ASSISTANT

## 2024-02-02 PROCEDURE — 80053 COMPREHEN METABOLIC PANEL: CPT | Performed by: PHYSICIAN ASSISTANT

## 2024-02-02 PROCEDURE — 90480 ADMN SARSCOV2 VAC 1/ONLY CMP: CPT | Performed by: PHYSICIAN ASSISTANT

## 2024-02-02 PROCEDURE — 99395 PREV VISIT EST AGE 18-39: CPT | Mod: 25 | Performed by: PHYSICIAN ASSISTANT

## 2024-02-02 PROCEDURE — 99213 OFFICE O/P EST LOW 20 MIN: CPT | Mod: 25 | Performed by: PHYSICIAN ASSISTANT

## 2024-02-02 PROCEDURE — 84443 ASSAY THYROID STIM HORMONE: CPT | Performed by: PHYSICIAN ASSISTANT

## 2024-02-02 PROCEDURE — 80061 LIPID PANEL: CPT | Performed by: PHYSICIAN ASSISTANT

## 2024-02-02 PROCEDURE — 90471 IMMUNIZATION ADMIN: CPT | Performed by: PHYSICIAN ASSISTANT

## 2024-02-02 RX ORDER — KETOCONAZOLE 20 MG/ML
SHAMPOO TOPICAL
Qty: 120 ML | Refills: 1 | Status: SHIPPED | OUTPATIENT
Start: 2024-02-02

## 2024-02-02 ASSESSMENT — ENCOUNTER SYMPTOMS
COUGH: 1
CONSTIPATION: 0
SHORTNESS OF BREATH: 0
EYE PAIN: 0
FEVER: 0
WEAKNESS: 0
HEMATURIA: 0
NERVOUS/ANXIOUS: 1
DIARRHEA: 0
DIZZINESS: 0
PALPITATIONS: 0
FREQUENCY: 0
NAUSEA: 0
HEADACHES: 0
DYSURIA: 0
SORE THROAT: 0
ABDOMINAL PAIN: 0
ARTHRALGIAS: 0
JOINT SWELLING: 0
MYALGIAS: 0
CHILLS: 0

## 2024-02-02 ASSESSMENT — PAIN SCALES - GENERAL: PAINLEVEL: NO PAIN (0)

## 2024-02-02 NOTE — PROGRESS NOTES
Preventive Care Visit  [facility] Heart PA-C, Physician Assistant - Medical  Feb 2, 2024  {Provider  Link to SmartSet :837542}  {PROVIDER CHARTING PREFERENCE:211395}    Laurent Gonzales is a 38 year old, presenting for the following:  Physical    {(!) Visit Details have not yet been documented.  Please enter Visit Details and then use this list to pull in documentation. (Optional):469485}     Health Care Directive  Patient does not have a Health Care Directive or Living Will: Discussed advance care planning with patient; however, patient declined at this time.    Healthy Habits:     Getting at least 3 servings of Calcium per day:  Yes    Bi-annual eye exam:  Yes    Dental care twice a year:  Yes    Sleep apnea or symptoms of sleep apnea:  None    Diet:  Other    Frequency of exercise:  2-3 days/week    Duration of exercise:  Less than 15 minutes    Taking medications regularly:  Yes    Barriers to taking medications:  None    Medication side effects:  Not applicable    Additional concerns today:  Yes    ***  {MA/LPN/RN Pre-Provider Visit Orders- hCG/UA/Strep (Optional):292342}  {SUPERLIST (Optional):117542}  {additonal problems for provider to add (Optional):956113}     {Rooming Staff Patient needs a PHQ as part of the AWV.  Use this link to complete and then refresh the note to pull results Link to PHQ2 Assessment :294472}  {USE TO PULL IN PHQ RESULTS FOR TODAY:850334}      1/30/2024   Substance Use   Alcohol more than 3/day or more than 7/wk No     Social History     Tobacco Use    Smoking status: Never    Smokeless tobacco: Never   Vaping Use    Vaping Use: Never used   Substance Use Topics    Alcohol use: Yes     Comment: 2-3 drinks per week    Drug use: Never     {If there are gaps in the social history shown above, please follow the link to update and then refresh the note Link to Social and Substance History :693199}      1/30/2024   LAST FHS-7 RESULTS   1st degree  relative breast or ovarian cancer No   Any relative bilateral breast cancer No   Any male have breast cancer No   Any woman have breast and ovarian cancer No   Any woman with breast cancer before 50yrs No   2 or more relatives with breast and/or ovarian cancer Yes   2 or more relatives with breast and/or bowel cancer Yes     {If any of the questions to the FHS7 are answered yes, consider referral for genetic counseling.    Additional indications for genetic referral include personal history of breast or ovarian cancer, genetic mutation in 1st degree relative which increases risk of breast cancer including BRCA1, BRCA2, ELIZABETH, PALB 2, TP53, CHEK2, PTEN, CDH1, STK11 (per ACS) and/or 1st degree relative with history of pancreatic or high-risk prostate cancer (per NCCN):688690}   {Mammogram Decision Support (Optional):485258}      History of abnormal Pap smear: { :268572}        Latest Ref Rng & Units 8/25/2022     9:30 AM 8/10/2017    11:54 AM   PAP / HPV   PAP  Negative for Intraepithelial Lesion or Malignancy (NILM)     HPV 16 DNA Negative Negative     HPV 18 DNA Negative Negative     Other HR HPV Negative Negative     PAP-ABSTRACT   See Scanned Document           This result is from an external source.            No data to display              {Use the storyboard to review patient history, after sections have been marked as reviewed, refresh note to capture documentation:278919}   Reviewed and updated as needed this visit by Provider                    {HISTORY OPTIONS (Optional):320701}  Review of Systems   Constitutional:  Negative for chills and fever.   HENT:  Positive for congestion and ear pain. Negative for hearing loss and sore throat.    Eyes:  Negative for pain and visual disturbance.   Respiratory:  Positive for cough. Negative for shortness of breath.    Cardiovascular:  Negative for chest pain and palpitations.   Gastrointestinal:  Negative for abdominal pain, constipation, diarrhea and nausea.  "  Genitourinary:  Negative for dysuria, frequency, genital sores, hematuria, pelvic pain, urgency, vaginal bleeding and vaginal discharge.   Musculoskeletal:  Negative for arthralgias, joint swelling and myalgias.   Skin:  Negative for rash.   Neurological:  Negative for dizziness, weakness and headaches.   Psychiatric/Behavioral:  The patient is nervous/anxious.      {[facility] Picklists (Optional):992553}     Objective    Exam  There were no vitals taken for this visit.   Estimated body mass index is 31.9 kg/m  as calculated from the following:    Height as of 7/21/23: 1.702 m (5' 7\").    Weight as of 7/21/23: 92.4 kg (203 lb 11.2 oz).    Physical Exam  {Exam Choices (Optional):113909}      Signed Electronically by: Aj Heart PA-C  {Email feedback regarding this note to [email or web address]   :859650}  "

## 2024-02-02 NOTE — PROGRESS NOTES
"Preventive Care Visit  [facility]  Aj Heart PA-C, Physician Assistant - Medical  Feb 2, 2024    Assessment & Plan     Annual physical exam  Annual labs today - Recommended routine dental, eye, and skin screenings.   UTD on PAP. Mammo at 40 discussed.   - CBC with platelets and differential  - Comprehensive metabolic panel (BMP + Alb, Alk Phos, ALT, AST, Total. Bili, TP)  - TSH with free T4 reflex  - Lipid panel reflex to direct LDL Non-fasting    Attention deficit hyperactivity disorder (ADHD), unspecified ADHD type  Continue Adderall 15 mg IR. 6 month follow-up.     CARDIOVASCULAR SCREENING; LDL GOAL LESS THAN 130  - Lipid panel reflex to direct LDL Non-fasting    Seborrheic dermatitis  Suspect fungal in origin. Ketoconazle shampoo ordered. Derm contacts provided if no improvement.   - ketoconazole (NIZORAL) 2 % external shampoo  Dispense: 120 mL; Refill: 1      30 minutes spent by me on the date of the encounter doing chart review, review of test results, interpretation of tests, patient visit, and documentation       BMI  Estimated body mass index is 29.76 kg/m  as calculated from the following:    Height as of this encounter: 1.689 m (5' 6.5\").    Weight as of this encounter: 84.9 kg (187 lb 3.2 oz).   Weight management plan: Discussed healthy diet and exercise guidelines    Counseling  Appropriate preventive services were discussed with this patient, including applicable screening as appropriate for fall prevention, nutrition, physical activity, Tobacco-use cessation, weight loss and cognition.  Checklist reviewing preventive services available has been given to the patient.  Reviewed patient's diet, addressing concerns and/or questions.     Patient has been advised of split billing requirements and indicates understanding: Yes      Laurent Gonzales is a 38 year old, presenting for the following:  Physical    Here today for her annual physical.   Doing well. Son is 10 months " old. 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    -Adderall 15 mg IR helpful for focus without causing issues with insomnia as the XR did.     -Notes dryness/flaking/scaling of scalp. Last few months.     Health Care Directive  Patient does not have a Health Care Directive or Living Will:     Healthy Habits:     Getting at least 3 servings of Calcium per day:  Yes    Bi-annual eye exam:  Yes    Dental care twice a year:  Yes    Sleep apnea or symptoms of sleep apnea:  None    Diet:  Other    Frequency of exercise:  2-3 days/week    Duration of exercise:  Less than 15 minutes    Taking medications regularly:  Yes    Barriers to taking medications:  None    Medication side effects:  Not applicable    Additional concerns today:  Yes        Today's PHQ-2 Score:       2/2/2024     9:37 AM   PHQ-2 ( 1999 [employer])   Q1: Little interest or pleasure in doing things 1   Q2: Feeling down, depressed or hopeless 0   PHQ-2 Score 1           1/30/2024   Substance Use   Alcohol more than 3/day or more than 7/wk No     Social History     Tobacco Use    Smoking status: Never    Smokeless tobacco: Never   Vaping Use    Vaping Use: Never used   Substance Use Topics    Alcohol use: Yes     Comment: 2-3 drinks per week    Drug use: Never           1/30/2024   LAST FHS-7 RESULTS   1st degree relative breast or ovarian cancer No   Any relative bilateral breast cancer No   Any male have breast cancer No   Any woman have breast and ovarian cancer No   Any woman with breast cancer before 50yrs No   2 or more relatives with breast and/or ovarian cancer Yes   2 or more relatives with breast and/or bowel cancer Yes          History of abnormal Pap smear:        Latest Ref Rng & Units 8/25/2022     9:30 AM 8/10/2017    11:54 AM   PAP / HPV   PAP  Negative for Intraepithelial Lesion or Malignancy (NILM)     HPV 16 DNA Negative Negative     HPV 18 DNA Negative Negative     Other HR HPV Negative Negative     PAP-ABSTRACT   See  Scanned Document           This result is from an external source.                Reviewed and updated as needed this visit by Provider                    Past Medical History:   Diagnosis Date    Anxiety     Depressive disorder 10/28/2022    Not feeling any mild-severe symptoms currently    Depressive disorder 10/28/2022    Not feeling any mild-severe symptoms currently    Varicella      Past Surgical History:   Procedure Laterality Date    ABDOMEN SURGERY  3/16/23    Csection     SECTION N/A 2023    Procedure:  section;  Surgeon: Aracelis Buchanan MD;  Location: [facility]    wisdom tooth removal       OB History    Para Term  AB Living   1 1 1 0 0 1   SAB IAB Ectopic Multiple Live Births   0 0 0 0 1      # Outcome Date GA Lbr Radu/2nd Weight Sex Delivery Anes PTL Lv   1 Term 23 39w1d  3.062 kg (6 lb 12 oz) M CS-LTranv Spinal N JOCELYNN      Complications: Fetal Intolerance      Name: TIFFANI LOMELI      Apgar1: 9  Apgar5: 9     Lab work is in process  BP Readings from Last 3 Encounters:   24 120/81   23 124/83   23 116/77    Wt Readings from Last 3 Encounters:   24 84.9 kg (187 lb 3.2 oz)   23 92.4 kg (203 lb 11.2 oz)   23 90.7 kg (199 lb 14.4 oz)                  Patient Active Problem List   Diagnosis    Acne    Child attention deficit disorder    Depressive disorder    Generalized anxiety disorder     Past Surgical History:   Procedure Laterality Date    ABDOMEN SURGERY  3/16/23    Csection     SECTION N/A 2023    Procedure:  section;  Surgeon: Aracelis Buchanan MD;  Location: [facility]    wisdom tooth removal         Social History     Tobacco Use    Smoking status: Never    Smokeless tobacco: Never   Substance Use Topics    Alcohol use: Yes     Comment: 2-3 drinks per week     Family History   Problem Relation Age of Onset    Depression Mother     Anxiety Disorder Mother     Obesity Mother     No Known  Problems Father     Hypertension Maternal Grandmother     Depression Maternal Grandmother     Anxiety Disorder Maternal Grandmother     Hypertension Maternal Grandfather     Other Cancer Maternal Grandfather     No Known Problems Paternal Grandmother     No Known Problems Paternal Grandfather     Breast Cancer Other         Maternal Aunt         Current Outpatient Medications   Medication Sig Dispense Refill    ALPRAZolam (XANAX) 0.5 MG tablet Take 0.5 mg by mouth daily as needed for anxiety      amphetamine-dextroamphetamine (ADDERALL) 15 MG tablet Take 1 tablet (15 mg) by mouth daily 30 tablet 0    hydrOXYzine (ATARAX) 25 MG tablet Take 1 tablet (25 mg) by mouth 3 times daily as needed for itching or anxiety 30 tablet 1    ketoconazole (NIZORAL) 2 % external shampoo 5 to 10 mL of shampoo should be left on for three to five minutes before rinsing off. Use two to three times per week  four weeks 120 mL 1    levonorg-eth estrad triphasic 50-30/75-40/ 125-30 MCG TABS Take 1 tablet by mouth daily 90 tablet 4    UNABLE TO FIND MEDICATION NAME: Calm powder      amphetamine-dextroamphetamine (ADDERALL XR) 15 MG 24 hr capsule Take 1 capsule (15 mg) by mouth daily (Patient not taking: Reported on 2/2/2024) 30 capsule 0    Prenatal Vit-Fe Fumarate-FA (PRENATAL VITAMIN PO)  (Patient not taking: Reported on 7/21/2023)       Allergies   Allergen Reactions    Blood Transfusion Related (Informational Only) Other (See Comments)     Notified of delay in availability of crossmatched red blood cells due to positive antibody screen 8/25/22; negative antibody screeen 3/16/2023    Seasonal Allergies      Review of Systems   Constitutional:  Negative for chills and fever.   HENT:  Positive for congestion and ear pain. Negative for hearing loss and sore throat.    Eyes:  Negative for pain and visual disturbance.   Respiratory:  Positive for cough. Negative for shortness of breath.    Cardiovascular:  Negative for chest pain and  "palpitations.   Gastrointestinal:  Negative for abdominal pain, constipation, diarrhea and nausea.   Genitourinary:  Negative for dysuria, frequency, genital sores, hematuria, pelvic pain, urgency, vaginal bleeding and vaginal discharge.   Musculoskeletal:  Negative for arthralgias, joint swelling and myalgias.   Skin:  Negative for rash.   Neurological:  Negative for dizziness, weakness and headaches.   Psychiatric/Behavioral:  The patient is nervous/anxious.        Review of Systems  Constitutional, neuro, ENT, endocrine, pulmonary, cardiac, gastrointestinal, genitourinary, musculoskeletal, integument and psychiatric systems are negative, except as otherwise noted.     Objective    Exam  /81 (BP Location: Right arm, Patient Position: Sitting, Cuff Size: Adult Large)   Pulse 84   Temp 99  F (37.2  C) (Tympanic)   Resp 16   Ht 1.689 m (5' 6.5\")   Wt 84.9 kg (187 lb 3.2 oz)   LMP 01/18/2024 (Exact Date)   SpO2 99%   BMI 29.76 kg/m     Estimated body mass index is 29.76 kg/m  as calculated from the following:    Height as of this encounter: 1.689 m (5' 6.5\").    Weight as of this encounter: 84.9 kg (187 lb 3.2 oz).    Physical Exam  GENERAL: alert and no distress  EYES: Eyes grossly normal to inspection, PERRL and conjunctivae and sclerae normal  HENT: ear canals and TM's normal, nose and mouth without ulcers or lesions  NECK: no adenopathy, no asymmetry, masses, or scars  RESP: lungs clear to auscultation - no rales, rhonchi or wheezes  CV: regular rate and rhythm, normal S1 S2, no S3 or S4, no murmur, click or rub, no peripheral edema  ABDOMEN: soft, nontender, no hepatosplenomegaly, no masses and bowel sounds normal  MS: no gross musculoskeletal defects noted, no edema  SKIN: flaking, dry scaling patches front scalp and posterior with excoriations. No additional suspicious lesions or rashes  NEURO: Normal strength and tone, mentation intact and speech normal  PSYCH: mentation appears normal, affect " normal/bright  LYMPH: no cervical, supraclavicular, axillary, or inguinal adenopathy    The likelihood of other entities in the differential is insufficient to justify any further testing for them at this time. This was explained to the patient. The patient was advised that persistent or worsening symptoms would require further evaluation. Patient advised to call the office and if unable to reach to go to the emergency room if they develop any new or worsening symptoms. Expressed understanding and agreement with above stated plan.         Signed Electronically by: Aj Heart PA-C

## 2024-02-02 NOTE — PATIENT INSTRUCTIONS
Dermatology:       [facility]    [facility]  [address], [address]  [place] [address] [phone]       [facility]    [address], [place] [address] [phone]    [facility]:     [place] [address]  [address] [facility] [address] [place] [address]  Main Phone: [phone]    OR    [facility] [address]  [address] [place] [address] [phone]

## 2024-02-05 NOTE — RESULT ENCOUNTER NOTE
Gerson Gonzales,     It was nice seeing you on last week!     -Stable blood counts. No signs of anemia.     -Your comprehensive metabolic panel which includes tests of liver function (ALT, AST, total bilirubin, alkaline phosphatase), kidney function (creatinine, BUN), electrolytes (sodium, potassium, calcium), and blood sugar (glucose) returned stable for you.    -TSH (thyroid stimulating hormone) level is normal which indicates normal circulating thyroid hormone levels.     -Cholesterol levels are satisfactory. Mild elevation in your LDL (bad cholesterol) however you have great HDL (good cholesterol) numbers. We will continue to monitor this in the future. Healthy diet and exercise is key. Will attach diet recommendations below.     Cut back on the amount of fat and cholesterol in your meals  Eat more fresh vegetables and fruits  Eat lean proteins such as fish, poultry, beans, and peas  Eat less red meat and processed meats  Use low-fat dairy products  Use vegetable and nut oils in limited amounts  Limit sweets and processed foods like chips, cookies, and baked goods  Limit sugar-sweetened beverages you drink  Limit how often you eat out  Limit alcohol    Let me know if you have any questions or concerns,     ZAHIDA Case [facility]

## 2024-02-26 ENCOUNTER — MYC REFILL (OUTPATIENT)
Dept: FAMILY MEDICINE | Facility: CLINIC | Age: 39
End: 2024-02-26
Payer: COMMERCIAL

## 2024-02-26 DIAGNOSIS — F90.9 ATTENTION DEFICIT HYPERACTIVITY DISORDER (ADHD), UNSPECIFIED ADHD TYPE: ICD-10-CM

## 2024-02-26 RX ORDER — DEXTROAMPHETAMINE SACCHARATE, AMPHETAMINE ASPARTATE, DEXTROAMPHETAMINE SULFATE AND AMPHETAMINE SULFATE 3.75; 3.75; 3.75; 3.75 MG/1; MG/1; MG/1; MG/1
15 TABLET ORAL DAILY
Qty: 30 TABLET | Refills: 0 | Status: SHIPPED | OUTPATIENT
Start: 2024-02-26 | End: 2024-04-10

## 2024-03-22 ENCOUNTER — VIRTUAL VISIT (OUTPATIENT)
Dept: FAMILY MEDICINE | Facility: CLINIC | Age: 39
End: 2024-03-22
Payer: COMMERCIAL

## 2024-03-22 DIAGNOSIS — H10.31 ACUTE BACTERIAL CONJUNCTIVITIS OF RIGHT EYE: Primary | ICD-10-CM

## 2024-03-22 PROCEDURE — 99213 OFFICE O/P EST LOW 20 MIN: CPT | Mod: 95 | Performed by: PHYSICIAN ASSISTANT

## 2024-03-22 RX ORDER — OFLOXACIN 3 MG/ML
1-2 SOLUTION/ DROPS OPHTHALMIC 4 TIMES DAILY
Qty: 10 ML | Refills: 0 | Status: SHIPPED | OUTPATIENT
Start: 2024-03-22 | End: 2024-03-29

## 2024-03-22 ASSESSMENT — ENCOUNTER SYMPTOMS: EYE PAIN: 1

## 2024-03-22 NOTE — PROGRESS NOTES
Christian is a 38 year old who is being evaluated via a billable video visit.    How would you like to obtain your AVS? MyChart  If the video visit is dropped, the invitation should be resent by: Text to cell phone: [phone]  Will anyone else be joining your video visit? No      Assessment & Plan     Acute bacterial conjunctivitis of right eye  Suspected bacterial conjunctivitis. Hand hygiene. Contact lens wearer. Sent ofloxacin. Close follow-up if new or worsening symptoms.   - ofloxacin (OCUFLOX) 0.3 % ophthalmic solution  Dispense: 10 mL; Refill: 0    10 minutes spent by me on the date of the encounter doing chart review, review of test results, interpretation of tests, patient visit, and documentation     Subjective   Christian is a 38 year old, presenting for the following health issues:  Eye Problem    Woke up this morning with pink/red eye.  Notes discharge as well as a feeling of being stuck shut.  Denies foreign body.  No fever, chills, sweats or associated upper respiratory symptoms.  Contact lens wear.  Denies loss of vision.    History of Present Illness       Reason for visit:  Pink eye  Symptom onset:  Today  Symptoms include:  Swollen eye, puffy, discharge coming out of wye  Symptom intensity:  Moderate  Symptom progression:  Staying the same  Had these symptoms before:  No    She eats 2-3 servings of fruits and vegetables daily.She consumes 0 sweetened beverage(s) daily.She exercises with enough effort to increase her heart rate 30 to 60 minutes per day.  She exercises with enough effort to increase her heart rate 4 days per week.   She is taking medications regularly.        Review of Systems  Constitutional, neuro, ENT, endocrine, pulmonary, cardiac, gastrointestinal, genitourinary, musculoskeletal, integument and psychiatric systems are negative, except as otherwise noted.      Objective           Vitals:  No vitals were obtained today due to virtual visit.    Physical Exam   GENERAL: alert and no  distress  EYES: Right conjunctive with redness.  Seemingly no orbital swelling/ecchymosis/preseptal cellulitis noted.  EOMs intact via video visit.  Eyes grossly normal to inspection.  No discharge or erythema, or obvious scleral/conjunctival abnormalities.  RESP: No audible wheeze, cough, or visible cyanosis.    SKIN: Visible skin clear. No significant rash, abnormal pigmentation or lesions.  NEURO: Cranial nerves grossly intact.  Mentation and speech appropriate for age.  PSYCH: Appropriate affect, tone, and pace of words      Video-Visit Details    Type of service:  Video Visit   Originating Location (pt. Location): Home    Distant Location (provider location):  On-site  Platform used for Video Visit: Sherry    The likelihood of other entities in the differential is insufficient to justify any further testing for them at this time. This was explained to the patient. The patient was advised that persistent or worsening symptoms would require further evaluation. Patient advised to call the office and if unable to reach to go to the emergency room if they develop any new or worsening symptoms. Expressed understanding and agreement with above stated plan.     Signed Electronically by: Aj Heart PA-C

## 2024-04-10 ENCOUNTER — MYC REFILL (OUTPATIENT)
Dept: FAMILY MEDICINE | Facility: CLINIC | Age: 39
End: 2024-04-10
Payer: COMMERCIAL

## 2024-04-10 DIAGNOSIS — F90.9 ATTENTION DEFICIT HYPERACTIVITY DISORDER (ADHD), UNSPECIFIED ADHD TYPE: ICD-10-CM

## 2024-04-10 RX ORDER — DEXTROAMPHETAMINE SACCHARATE, AMPHETAMINE ASPARTATE, DEXTROAMPHETAMINE SULFATE AND AMPHETAMINE SULFATE 3.75; 3.75; 3.75; 3.75 MG/1; MG/1; MG/1; MG/1
15 TABLET ORAL DAILY
Qty: 30 TABLET | Refills: 0 | Status: SHIPPED | OUTPATIENT
Start: 2024-04-10 | End: 2024-05-15

## 2024-05-15 ENCOUNTER — MYC REFILL (OUTPATIENT)
Dept: FAMILY MEDICINE | Facility: CLINIC | Age: 39
End: 2024-05-15
Payer: COMMERCIAL

## 2024-05-15 DIAGNOSIS — F90.9 ATTENTION DEFICIT HYPERACTIVITY DISORDER (ADHD), UNSPECIFIED ADHD TYPE: ICD-10-CM

## 2024-05-15 RX ORDER — DEXTROAMPHETAMINE SACCHARATE, AMPHETAMINE ASPARTATE, DEXTROAMPHETAMINE SULFATE AND AMPHETAMINE SULFATE 3.75; 3.75; 3.75; 3.75 MG/1; MG/1; MG/1; MG/1
15 TABLET ORAL DAILY
Qty: 30 TABLET | Refills: 0 | Status: SHIPPED | OUTPATIENT
Start: 2024-05-15 | End: 2024-06-24

## 2024-07-02 ENCOUNTER — MYC REFILL (OUTPATIENT)
Dept: FAMILY MEDICINE | Facility: CLINIC | Age: 39
End: 2024-07-02
Payer: COMMERCIAL

## 2024-07-02 DIAGNOSIS — F41.1 GENERALIZED ANXIETY DISORDER: Primary | ICD-10-CM

## 2024-07-03 RX ORDER — ALPRAZOLAM 0.5 MG
0.5 TABLET ORAL DAILY PRN
Qty: 15 TABLET | Refills: 0 | Status: SHIPPED | OUTPATIENT
Start: 2024-07-03

## 2024-07-03 RX ORDER — ALPRAZOLAM 0.5 MG
0.5 TABLET ORAL DAILY PRN
OUTPATIENT
Start: 2024-07-03

## 2024-07-03 NOTE — TELEPHONE ENCOUNTER
Aj Heart PA-C   to [facility]         7/3/24 12:41 PM  Can we triage this? I've never prescribed for her or discussed I don't think. Historic med. Not ideal for daily management of anxiety so should really only be used PRN. Appt next week with me? Virtual or phone would be ok    [facility] PCP:     Spoke with patient     Takes Alprazolam very PRN - sometimes takes 1-2 times then may go 6 months without taking it. Also uses for flying to relax anxiety before flights     Has been experiencing situations where anxiety is not management in moment but generally just for a short period and not all throughout the day     Has some stuff going on at work, gets anxiety during small parts of the day     Does not feel she needs a daily medication    Works in mental health field herself - working with goals for managing her anxiety - sometimes anxiety is affecting sleep     Agreeable to discussing via VV, scheduled for next week:     Appointments in Next Year      Jul 11, 2024 5:30 PM  (Arrive by 5:25 PM)  Provider Visit with Aj Heart PA-C  [facility] ([facility] ) [phone]     Aug 02, 2024 4:00 PM  (Arrive by 3:55 PM)  Provider Visit with Aj Heart PA-C  [facility] ([facility] ) [phone]     Feb 07, 2025 9:30 AM  (Arrive by 9:10 AM)  Adult Preventative Visit with Aj Heart PA-C  [facility] ([facility] ) [phone]        Norberto Covarrubias RN  [facility]

## 2024-07-08 DIAGNOSIS — Z30.011 ENCOUNTER FOR INITIAL PRESCRIPTION OF CONTRACEPTIVE PILLS: ICD-10-CM

## 2024-07-08 RX ORDER — LEVONORGESTREL AND ETHINYL ESTRADIOL 6-5-10
1 KIT ORAL DAILY
Qty: 90 TABLET | Refills: 4 | Status: CANCELLED | OUTPATIENT
Start: 2024-07-08

## 2024-07-08 RX ORDER — LEVONORGESTREL AND ETHINYL ESTRADIOL 6-5-10
1 KIT ORAL DAILY
Qty: 90 TABLET | Refills: 4 | OUTPATIENT
Start: 2024-07-08

## 2024-07-08 RX ORDER — LEVONORGESTREL AND ETHINYL ESTRADIOL 6-5-10
1 KIT ORAL DAILY
Qty: 90 TABLET | Refills: 4 | Status: SHIPPED | OUTPATIENT
Start: 2024-07-08 | End: 2024-08-02

## 2024-07-08 NOTE — PROGRESS NOTES
Requested Prescriptions   Pending Prescriptions Disp Refills    levonorg-eth estrad triphasic 50-30/75-40/ 125-30 MCG TABS 90 tablet 4     Sig: Take 1 tablet by mouth daily       There is no refill protocol information for this order

## 2024-07-08 NOTE — TELEPHONE ENCOUNTER
She had annual physical with her PCP in 2/24 and no concerns noted that would stop her from getting refill.  One year refill given.  Thanks    KETTY PAZ MD

## 2024-07-10 ASSESSMENT — ANXIETY QUESTIONNAIRES
8. IF YOU CHECKED OFF ANY PROBLEMS, HOW DIFFICULT HAVE THESE MADE IT FOR YOU TO DO YOUR WORK, TAKE CARE OF THINGS AT HOME, OR GET ALONG WITH OTHER PEOPLE?: SOMEWHAT DIFFICULT
3. WORRYING TOO MUCH ABOUT DIFFERENT THINGS: SEVERAL DAYS
IF YOU CHECKED OFF ANY PROBLEMS ON THIS QUESTIONNAIRE, HOW DIFFICULT HAVE THESE PROBLEMS MADE IT FOR YOU TO DO YOUR WORK, TAKE CARE OF THINGS AT HOME, OR GET ALONG WITH OTHER PEOPLE: SOMEWHAT DIFFICULT
2. NOT BEING ABLE TO STOP OR CONTROL WORRYING: SEVERAL DAYS
GAD7 TOTAL SCORE: 8
1. FEELING NERVOUS, ANXIOUS, OR ON EDGE: MORE THAN HALF THE DAYS
7. FEELING AFRAID AS IF SOMETHING AWFUL MIGHT HAPPEN: SEVERAL DAYS
7. FEELING AFRAID AS IF SOMETHING AWFUL MIGHT HAPPEN: SEVERAL DAYS
GAD7 TOTAL SCORE: 8
4. TROUBLE RELAXING: SEVERAL DAYS
6. BECOMING EASILY ANNOYED OR IRRITABLE: SEVERAL DAYS
5. BEING SO RESTLESS THAT IT IS HARD TO SIT STILL: SEVERAL DAYS

## 2024-07-11 ENCOUNTER — VIRTUAL VISIT (OUTPATIENT)
Dept: FAMILY MEDICINE | Facility: CLINIC | Age: 39
End: 2024-07-11
Payer: COMMERCIAL

## 2024-07-11 DIAGNOSIS — F41.1 GENERALIZED ANXIETY DISORDER: Primary | ICD-10-CM

## 2024-07-11 PROCEDURE — 99213 OFFICE O/P EST LOW 20 MIN: CPT | Mod: 95 | Performed by: PHYSICIAN ASSISTANT

## 2024-07-11 RX ORDER — VENLAFAXINE HYDROCHLORIDE 75 MG/1
75 CAPSULE, EXTENDED RELEASE ORAL DAILY
Qty: 30 CAPSULE | Refills: 2 | Status: SHIPPED | OUTPATIENT
Start: 2024-07-11 | End: 2024-08-08

## 2024-07-11 NOTE — PROGRESS NOTES
Christian is a 38 year old who is being evaluated via a billable video visit.    How would you like to obtain your AVS? MyChart  If the video visit is dropped, the invitation should be resent by: Text to cell phone: [phone]  Will anyone else be joining your video visit? No      Assessment & Plan     Generalized anxiety disorder  Xanax as needed only for the short-term.  Start Effexor 75 mg daily.  Reviewed safety/side effects.  We have a follow-up scheduled for 8/2/24 already.  This is a good time to check in on this medication to assess its effectiveness.  Close follow-up with new or worsening symptoms in the meantime.  She is comfortable with this plan.  - venlafaxine (EFFEXOR XR) 75 MG 24 hr capsule  Dispense: 30 capsule; Refill: 2    Subjective   Christian is a pleasant 38 year old, presenting for the following health issues:  Anxiety    Here today to discuss recent increase in anxiety.  Significant stressors with work.  Burnout.  Requested prescription for Xanax last week which she has used previously.  This is beneficial for her even though she is only used it a few times.  Having panic-like symptoms which this helps resolved.  Works as a therapist herself.  Not interested in pursuing therapy at this time.  However, we discussed a daily maintenance medication at this time to manage her anxiety.  Was previously on Lexapro and did not find it to be overly effective.  Additionally, not interested in any medication that would cause her weight gain.      History of Present Illness       Mental Health Follow-up:  Patient presents to follow-up on Depression & Anxiety.Patient's depression since last visit has been:  Medium  The patient is not having other symptoms associated with depression.  Patient's anxiety since last visit has been:  Worse  The patient is having other symptoms associated with anxiety.  Any significant life events: job concerns, financial concerns and grief or loss  Patient is feeling anxious or  having panic attacks.  Patient has no concerns about alcohol or drug use.    She eats 2-3 servings of fruits and vegetables daily.She consumes 0 sweetened beverage(s) daily.She exercises with enough effort to increase her heart rate 20 to 29 minutes per day.  She exercises with enough effort to increase her heart rate 4 days per week.   She is taking medications regularly.       Review of Systems  Constitutional, neuro, ENT, endocrine, pulmonary, cardiac, gastrointestinal, genitourinary, musculoskeletal, integument and psychiatric systems are negative, except as otherwise noted.      Objective           Vitals:  No vitals were obtained today due to virtual visit.    Physical Exam   GENERAL: alert and no distress  EYES: Eyes grossly normal to inspection.  No discharge or erythema, or obvious scleral/conjunctival abnormalities.  RESP: No audible wheeze, cough, or visible cyanosis.    SKIN: Visible skin clear. No significant rash, abnormal pigmentation or lesions.  NEURO: Cranial nerves grossly intact.  Mentation and speech appropriate for age.  PSYCH: Appropriate affect, tone, and pace of words        Video-Visit Details    Type of service:  Video Visit   Originating Location (pt. Location): Home    Distant Location (provider location):  On-site  Platform used for Video Visit: [facility]    The likelihood of other entities in the differential is insufficient to justify any further testing for them at this time. This was explained to the patient. The patient was advised that persistent or worsening symptoms would require further evaluation. Patient advised to call the office and if unable to reach to go to the emergency room if they develop any new or worsening symptoms. Expressed understanding and agreement with above stated plan.       Signed Electronically by: Aj Heart PA-C

## 2024-08-02 ENCOUNTER — VIRTUAL VISIT (OUTPATIENT)
Dept: FAMILY MEDICINE | Facility: CLINIC | Age: 39
End: 2024-08-02
Payer: COMMERCIAL

## 2024-08-02 ENCOUNTER — TELEPHONE (OUTPATIENT)
Dept: FAMILY MEDICINE | Facility: CLINIC | Age: 39
End: 2024-08-02

## 2024-08-02 DIAGNOSIS — Z30.011 ENCOUNTER FOR INITIAL PRESCRIPTION OF CONTRACEPTIVE PILLS: ICD-10-CM

## 2024-08-02 DIAGNOSIS — F41.1 GENERALIZED ANXIETY DISORDER: Primary | ICD-10-CM

## 2024-08-02 DIAGNOSIS — F90.9 ATTENTION DEFICIT HYPERACTIVITY DISORDER (ADHD), UNSPECIFIED ADHD TYPE: ICD-10-CM

## 2024-08-02 PROCEDURE — G2211 COMPLEX E/M VISIT ADD ON: HCPCS | Mod: 95 | Performed by: PHYSICIAN ASSISTANT

## 2024-08-02 PROCEDURE — 99213 OFFICE O/P EST LOW 20 MIN: CPT | Mod: 95 | Performed by: PHYSICIAN ASSISTANT

## 2024-08-02 RX ORDER — DEXTROAMPHETAMINE SACCHARATE, AMPHETAMINE ASPARTATE, DEXTROAMPHETAMINE SULFATE AND AMPHETAMINE SULFATE 3.75; 3.75; 3.75; 3.75 MG/1; MG/1; MG/1; MG/1
15 TABLET ORAL DAILY
Qty: 30 TABLET | Refills: 0 | Status: SHIPPED | OUTPATIENT
Start: 2024-09-01 | End: 2024-10-01

## 2024-08-02 RX ORDER — DEXTROAMPHETAMINE SACCHARATE, AMPHETAMINE ASPARTATE, DEXTROAMPHETAMINE SULFATE AND AMPHETAMINE SULFATE 3.75; 3.75; 3.75; 3.75 MG/1; MG/1; MG/1; MG/1
15 TABLET ORAL DAILY
Qty: 30 TABLET | Refills: 0 | Status: SHIPPED | OUTPATIENT
Start: 2024-10-01 | End: 2024-10-31

## 2024-08-02 RX ORDER — DEXTROAMPHETAMINE SACCHARATE, AMPHETAMINE ASPARTATE, DEXTROAMPHETAMINE SULFATE AND AMPHETAMINE SULFATE 3.75; 3.75; 3.75; 3.75 MG/1; MG/1; MG/1; MG/1
15 TABLET ORAL DAILY
Qty: 30 TABLET | Refills: 0 | Status: SHIPPED | OUTPATIENT
Start: 2024-08-02 | End: 2024-09-12

## 2024-08-02 RX ORDER — LEVONORGESTREL AND ETHINYL ESTRADIOL 6-5-10
1 KIT ORAL DAILY
Qty: 90 TABLET | Refills: 4 | Status: SHIPPED | OUTPATIENT
Start: 2024-08-02

## 2024-08-02 ASSESSMENT — ANXIETY QUESTIONNAIRES
8. IF YOU CHECKED OFF ANY PROBLEMS, HOW DIFFICULT HAVE THESE MADE IT FOR YOU TO DO YOUR WORK, TAKE CARE OF THINGS AT HOME, OR GET ALONG WITH OTHER PEOPLE?: SOMEWHAT DIFFICULT
6. BECOMING EASILY ANNOYED OR IRRITABLE: SEVERAL DAYS
IF YOU CHECKED OFF ANY PROBLEMS ON THIS QUESTIONNAIRE, HOW DIFFICULT HAVE THESE PROBLEMS MADE IT FOR YOU TO DO YOUR WORK, TAKE CARE OF THINGS AT HOME, OR GET ALONG WITH OTHER PEOPLE: SOMEWHAT DIFFICULT
GAD7 TOTAL SCORE: 6
5. BEING SO RESTLESS THAT IT IS HARD TO SIT STILL: SEVERAL DAYS
3. WORRYING TOO MUCH ABOUT DIFFERENT THINGS: SEVERAL DAYS
GAD7 TOTAL SCORE: 6
7. FEELING AFRAID AS IF SOMETHING AWFUL MIGHT HAPPEN: NOT AT ALL
7. FEELING AFRAID AS IF SOMETHING AWFUL MIGHT HAPPEN: NOT AT ALL
4. TROUBLE RELAXING: SEVERAL DAYS
1. FEELING NERVOUS, ANXIOUS, OR ON EDGE: SEVERAL DAYS
2. NOT BEING ABLE TO STOP OR CONTROL WORRYING: SEVERAL DAYS

## 2024-08-02 NOTE — PROGRESS NOTES
"Christian is a 38 year old who is being evaluated via a billable video visit.    How would you like to obtain your AVS? [employer]  If the video visit is dropped, the invitation should be resent by: Text to cell phone: [phone]  Will anyone else be joining your video visit? No      Assessment & Plan     Generalized anxiety disorder  Continue Effexor 75 mg daily.  Xanax as needed.  Follow-up as needed!  Happy things have improved.    Attention deficit hyperactivity disorder (ADHD), unspecified ADHD type  3 month script set sent to pharmacy.  PDMP reviewed.  - amphetamine-dextroamphetamine (ADDERALL) 15 MG tablet  Dispense: 30 tablet; Refill: 0  - amphetamine-dextroamphetamine (ADDERALL) 15 MG tablet  Dispense: 30 tablet; Refill: 0  - amphetamine-dextroamphetamine (ADDERALL) 15 MG tablet  Dispense: 30 tablet; Refill: 0    Encounter for initial prescription of contraceptive pills  Resent to different pharmacy.  If she needs an alternate should discuss with OB/GYN.  - levonorg-eth estrad triphasic 50-30/75-40/ 125-30 MCG TABS  Dispense: 90 tablet; Refill: 4    BMI  Estimated body mass index is 29.76 kg/m  as calculated from the following:    Height as of 2/2/24: 1.689 m (5' 6.5\").    Weight as of 2/2/24: 84.9 kg (187 lb 3.2 oz).   Weight management plan: Discussed healthy diet and exercise guidelines    The longitudinal plan of care for the diagnosis(es)/condition(s) as documented were addressed during this visit. Due to the added complexity in care, I will continue to support Christian in the subsequent management and with ongoing continuity of care.      Subjective   Christian is a 38 year old, presenting for the following health issues:  No chief complaint on file.    -Improvement in mood on Effexor over the past month.  Tolerating this well.  No side effects.  Has not had to use the Xanax.  Improving situation at work.    -Needs refill on her Adderall.  15 mg immediate release.    -Re-send OCP to different pharmacy as " they are out of stock.    History of Present Illness       Mental Health Follow-up:  Patient presents to follow-up on Anxiety.    Patient's anxiety since last visit has been:  Better  The patient is having other symptoms associated with anxiety.  Any significant life events: job concerns  Patient is feeling anxious or having panic attacks.  Patient has no concerns about alcohol or drug use.    Reason for visit:  Med check    She eats 2-3 servings of fruits and vegetables daily.She consumes 0 sweetened beverage(s) daily.She exercises with enough effort to increase her heart rate 30 to 60 minutes per day.  She exercises with enough effort to increase her heart rate 4 days per week.   She is taking medications regularly.         Review of Systems  Constitutional, neuro, ENT, endocrine, pulmonary, cardiac, gastrointestinal, genitourinary, musculoskeletal, integument and psychiatric systems are negative, except as otherwise noted.      Objective           Vitals:  No vitals were obtained today due to virtual visit.    Physical Exam   GENERAL: alert and no distress  EYES: Eyes grossly normal to inspection.  No discharge or erythema, or obvious scleral/conjunctival abnormalities.  RESP: No audible wheeze, cough, or visible cyanosis.    SKIN: Visible skin clear. No significant rash, abnormal pigmentation or lesions.  NEURO: Cranial nerves grossly intact.  Mentation and speech appropriate for age.  PSYCH: Appropriate affect, tone, and pace of words          Video-Visit Details    Type of service:  Video Visit   Originating Location (pt. Location): Home    Distant Location (provider location):  On-site  Platform used for Video Visit: [employer]    The likelihood of other entities in the differential is insufficient to justify any further testing for them at this time. This was explained to the patient. The patient was advised that persistent or worsening symptoms would require further evaluation. Patient advised to call the  office and if unable to reach to go to the emergency room if they develop any new or worsening symptoms. Expressed understanding and agreement with above stated plan.     Signed Electronically by: Aj Heart PA-C

## 2024-08-08 DIAGNOSIS — F41.1 GENERALIZED ANXIETY DISORDER: ICD-10-CM

## 2024-08-08 RX ORDER — VENLAFAXINE HYDROCHLORIDE 75 MG/1
75 CAPSULE, EXTENDED RELEASE ORAL DAILY
Qty: 90 CAPSULE | Refills: 1 | Status: SHIPPED | OUTPATIENT
Start: 2024-08-08

## 2024-09-12 ENCOUNTER — MYC REFILL (OUTPATIENT)
Dept: FAMILY MEDICINE | Facility: CLINIC | Age: 39
End: 2024-09-12
Payer: COMMERCIAL

## 2024-09-12 DIAGNOSIS — F90.9 ATTENTION DEFICIT HYPERACTIVITY DISORDER (ADHD), UNSPECIFIED ADHD TYPE: ICD-10-CM

## 2024-09-12 RX ORDER — DEXTROAMPHETAMINE SACCHARATE, AMPHETAMINE ASPARTATE, DEXTROAMPHETAMINE SULFATE AND AMPHETAMINE SULFATE 3.75; 3.75; 3.75; 3.75 MG/1; MG/1; MG/1; MG/1
15 TABLET ORAL DAILY
Qty: 30 TABLET | Refills: 0 | Status: SHIPPED | OUTPATIENT
Start: 2024-09-12

## 2024-10-23 ENCOUNTER — MYC REFILL (OUTPATIENT)
Dept: FAMILY MEDICINE | Facility: CLINIC | Age: 39
End: 2024-10-23
Payer: COMMERCIAL

## 2024-10-23 DIAGNOSIS — F90.9 ATTENTION DEFICIT HYPERACTIVITY DISORDER (ADHD), UNSPECIFIED ADHD TYPE: ICD-10-CM

## 2024-10-23 RX ORDER — DEXTROAMPHETAMINE SACCHARATE, AMPHETAMINE ASPARTATE, DEXTROAMPHETAMINE SULFATE AND AMPHETAMINE SULFATE 3.75; 3.75; 3.75; 3.75 MG/1; MG/1; MG/1; MG/1
15 TABLET ORAL DAILY
Qty: 30 TABLET | Refills: 0 | Status: SHIPPED | OUTPATIENT
Start: 2024-10-23

## 2024-11-25 ENCOUNTER — MYC REFILL (OUTPATIENT)
Dept: FAMILY MEDICINE | Facility: CLINIC | Age: 39
End: 2024-11-25
Payer: COMMERCIAL

## 2024-11-25 DIAGNOSIS — F90.9 ATTENTION DEFICIT HYPERACTIVITY DISORDER (ADHD), UNSPECIFIED ADHD TYPE: ICD-10-CM

## 2024-11-25 RX ORDER — DEXTROAMPHETAMINE SACCHARATE, AMPHETAMINE ASPARTATE, DEXTROAMPHETAMINE SULFATE AND AMPHETAMINE SULFATE 3.75; 3.75; 3.75; 3.75 MG/1; MG/1; MG/1; MG/1
15 TABLET ORAL DAILY
Qty: 30 TABLET | Refills: 0 | Status: SHIPPED | OUTPATIENT
Start: 2024-11-25

## 2024-12-31 ENCOUNTER — MYC REFILL (OUTPATIENT)
Dept: FAMILY MEDICINE | Facility: CLINIC | Age: 39
End: 2024-12-31
Payer: COMMERCIAL

## 2024-12-31 DIAGNOSIS — F90.9 ATTENTION DEFICIT HYPERACTIVITY DISORDER (ADHD), UNSPECIFIED ADHD TYPE: ICD-10-CM

## 2024-12-31 RX ORDER — DEXTROAMPHETAMINE SACCHARATE, AMPHETAMINE ASPARTATE, DEXTROAMPHETAMINE SULFATE AND AMPHETAMINE SULFATE 3.75; 3.75; 3.75; 3.75 MG/1; MG/1; MG/1; MG/1
15 TABLET ORAL DAILY
Qty: 30 TABLET | Refills: 0 | Status: SHIPPED | OUTPATIENT
Start: 2024-12-31

## 2025-01-27 ENCOUNTER — TELEPHONE (OUTPATIENT)
Dept: OBGYN | Facility: CLINIC | Age: 40
End: 2025-01-27
Payer: COMMERCIAL

## 2025-01-27 NOTE — TELEPHONE ENCOUNTER
newly pregnant LMP - ~6 weeks      Call to pt:  Pt question regarding her prescribed Effexor   Pt wants OK from OB to take    Newly pregnant as of this weekend   Hasn't taken med since  out of fear    Also concerned about following symptoms:  Headache. Right eye / cheek bone  Dull annoying pain, coming in the evening. Shows up nightly at 6 pm  Headaches started Friday.  Has a cold- runny nose- and is not sure if this is pregnancy related or if she has a sinus infection.       Looking for earlier appointment with OB and MFM referral  Wants genetic testing and worried because of AMA.   Currently scheduled with DR. Baugh 3/24 (~13 weeks)    Routing to provider to advise.    Vania Mcpherson RN on 2025 at 9:13 AM

## 2025-01-27 NOTE — TELEPHONE ENCOUNTER
Health Call Center    Phone Message    May a detailed message be left on voicemail: yes     Reason for Call: Pt called to schedule New OB appointments with Dr Baugh (LMP: 12/23/2024, AURA: 9/29/202 - csection) pt scheduled for provider's 1st available 3/24. Pt declined scheduling with another provider.    Pt also has medication questions she'd like to speak with a nurse about.    Please call pt Thank you    Action Taken: Message routed to:  Other: MONI PAULINO    Travel Screening: Not Applicable

## 2025-01-27 NOTE — TELEPHONE ENCOUNTER
[facility]    Phone Message    May a detailed message be left on voicemail: yes     Reason for Call: Other: Pt calling in regarding message below. Pt has concerns about medication Effexor that she is taking and would like to speak with a care team member. Writer instructed to send a TE. Please call pt      Action Taken: Message routed to:  Other: MONI PAULINO    Travel Screening: Not Applicable

## 2025-02-12 ENCOUNTER — TRANSCRIBE ORDERS (OUTPATIENT)
Dept: MATERNAL FETAL MEDICINE | Facility: CLINIC | Age: 40
End: 2025-02-12

## 2025-02-12 ENCOUNTER — VIRTUAL VISIT (OUTPATIENT)
Dept: OBGYN | Facility: CLINIC | Age: 40
End: 2025-02-12
Payer: COMMERCIAL

## 2025-02-12 VITALS — HEIGHT: 66 IN | BODY MASS INDEX: 27.42 KG/M2

## 2025-02-12 DIAGNOSIS — O26.90 PREGNANCY RELATED CONDITION, ANTEPARTUM: Primary | ICD-10-CM

## 2025-02-12 DIAGNOSIS — O09.529 ENCOUNTER FOR SUPERVISION OF MULTIGRAVIDA WITH ADVANCED MATERNAL AGE: Primary | ICD-10-CM

## 2025-02-12 PROBLEM — F32.A DEPRESSIVE DISORDER: Status: ACTIVE | Noted: 2022-10-28

## 2025-02-12 PROBLEM — F98.8 CHILD ATTENTION DEFICIT DISORDER: Status: ACTIVE | Noted: 2022-10-28

## 2025-02-12 PROCEDURE — 99207 PR NO CHARGE NURSE ONLY: CPT | Mod: 93

## 2025-02-12 NOTE — PROGRESS NOTES
Important Information for Provider:       New ob nurse intake by phone, second pregnancy, AMA. Handouts reviewed. Ordered genetic screening. Ultrasound scheduled for 2/21/2025.  NOB with Dr Baugh 2/24/2025       Caffeine intake/servings daily - 0  Calcium intake/servings daily - 3  Exercise 5 times weekly - describe ; Jason, , precautions given  Sunscreen used - Yes  Seatbelts used - Yes  Self Breast Exam - Yes  Pap test up to date -  Yes  Eye exam up to date -  Yes  Dental exam up to date -  Yes  Immunizations reviewed and up to date - Yes  Abuse: Current or Past (Physical, Sexual or Emotional) - No  Do you feel safe in your environment - Yes  Do you cope well with stress - Yes  Do you suffer from insomnia - No          Prenatal OB Questionnaire  Patient supplied answers from flow sheet for:  Prenatal OB Questionnaire.  Past Medical History  Have you ever recieved care for your mental health? : (!) (Patient-Rptd) Yes  Have you ever been in a major accident or suffered serious trauma?: (Patient-Rptd) No  Within the last year, has anyone hit, slapped, kicked or otherwise hurt you?: (Patient-Rptd) No  In the last year, has anyone forced you to have sex when you didn't want to?: (Patient-Rptd) No    Past Medical History 2   Have you ever received a blood transfusion?: (Patient-Rptd) No  Would you accept a blood transfusion if was medically recommended?: (Patient-Rptd) Yes  Does anyone in your home smoke?: (Patient-Rptd) No   Is your blood type Rh negative?: (Patient-Rptd) No  Have you ever ?: (!) (Patient-Rptd) Yes  Have you been hospitalized for a nonsurgical reason excluding normal delivery?: (Patient-Rptd) No  Have you ever had an abnormal pap smear?: (Patient-Rptd) No    Past Medical History (Continued)  Do you have a history of abnormalities of the uterus?: (Patient-Rptd) No  Did your mother take ALVA or any other hormones when she was pregnant with you?: (Patient-Rptd) Unknown  Do you  have any other problems we have not asked about which you feel may be important to this pregnancy?: (Patient-Rptd) No                     Allergies as of 2/12/2025:    Allergies as of 02/12/2025 - Reviewed 02/12/2025   Allergen Reaction Noted    Blood transfusion related (informational only) Other (See Comments) 08/25/2022    Seasonal allergies  11/23/2021                   Does patient have irregular periods?  No  Did patient use hormonal birth control in the three months prior to positive urine pregnancy test? No  Is the patient breastfeeding?  No  Is the patient 10 weeks or greater at time of education visit?  No

## 2025-02-19 DIAGNOSIS — F41.1 GENERALIZED ANXIETY DISORDER: ICD-10-CM

## 2025-02-19 RX ORDER — VENLAFAXINE HYDROCHLORIDE 75 MG/1
75 CAPSULE, EXTENDED RELEASE ORAL DAILY
Qty: 90 CAPSULE | Refills: 3 | Status: SHIPPED | OUTPATIENT
Start: 2025-02-19

## 2025-02-21 ENCOUNTER — ANCILLARY PROCEDURE (OUTPATIENT)
Dept: ULTRASOUND IMAGING | Facility: CLINIC | Age: 40
End: 2025-02-21
Payer: COMMERCIAL

## 2025-02-21 DIAGNOSIS — O09.529 ENCOUNTER FOR SUPERVISION OF MULTIGRAVIDA WITH ADVANCED MATERNAL AGE: ICD-10-CM

## 2025-02-21 PROCEDURE — 76801 OB US < 14 WKS SINGLE FETUS: CPT | Performed by: OBSTETRICS & GYNECOLOGY

## 2025-02-21 PROCEDURE — 76817 TRANSVAGINAL US OBSTETRIC: CPT | Performed by: OBSTETRICS & GYNECOLOGY

## 2025-02-23 LAB
ABO + RH BLD: NORMAL
BLD GP AB SCN SERPL QL: NEGATIVE
SPECIMEN EXP DATE BLD: NORMAL

## 2025-02-24 ENCOUNTER — PRENATAL OFFICE VISIT (OUTPATIENT)
Dept: OBGYN | Facility: CLINIC | Age: 40
End: 2025-02-24
Payer: COMMERCIAL

## 2025-02-24 VITALS
WEIGHT: 177.1 LBS | SYSTOLIC BLOOD PRESSURE: 108 MMHG | BODY MASS INDEX: 28.58 KG/M2 | TEMPERATURE: 97.3 F | DIASTOLIC BLOOD PRESSURE: 70 MMHG | HEART RATE: 82 BPM | OXYGEN SATURATION: 98 %

## 2025-02-24 DIAGNOSIS — O09.529 ENCOUNTER FOR SUPERVISION OF MULTIGRAVIDA WITH ADVANCED MATERNAL AGE: ICD-10-CM

## 2025-02-24 DIAGNOSIS — O34.219 PREVIOUS CESAREAN DELIVERY, ANTEPARTUM CONDITION OR COMPLICATION: Primary | ICD-10-CM

## 2025-02-24 DIAGNOSIS — O21.9 NAUSEA AND VOMITING DURING PREGNANCY: ICD-10-CM

## 2025-02-24 LAB
ALBUMIN UR-MCNC: NEGATIVE MG/DL
APPEARANCE UR: CLEAR
BASOPHILS # BLD AUTO: 0 10E3/UL (ref 0–0.2)
BASOPHILS NFR BLD AUTO: 0 %
BILIRUB UR QL STRIP: NEGATIVE
COLOR UR AUTO: YELLOW
EOSINOPHIL # BLD AUTO: 0.1 10E3/UL (ref 0–0.7)
EOSINOPHIL NFR BLD AUTO: 1 %
ERYTHROCYTE [DISTWIDTH] IN BLOOD BY AUTOMATED COUNT: 12.3 % (ref 10–15)
EST. AVERAGE GLUCOSE BLD GHB EST-MCNC: 103 MG/DL
GLUCOSE UR STRIP-MCNC: NEGATIVE MG/DL
HBA1C MFR BLD: 5.2 % (ref 0–5.6)
HCT VFR BLD AUTO: 39.6 % (ref 35–47)
HGB BLD-MCNC: 13.2 G/DL (ref 11.7–15.7)
HGB UR QL STRIP: NEGATIVE
IMM GRANULOCYTES # BLD: 0 10E3/UL
IMM GRANULOCYTES NFR BLD: 0 %
KETONES UR STRIP-MCNC: NEGATIVE MG/DL
LEUKOCYTE ESTERASE UR QL STRIP: NEGATIVE
LYMPHOCYTES # BLD AUTO: 2 10E3/UL (ref 0.8–5.3)
LYMPHOCYTES NFR BLD AUTO: 21 %
MCH RBC QN AUTO: 32.1 PG (ref 26.5–33)
MCHC RBC AUTO-ENTMCNC: 33.3 G/DL (ref 31.5–36.5)
MCV RBC AUTO: 96 FL (ref 78–100)
MONOCYTES # BLD AUTO: 0.5 10E3/UL (ref 0–1.3)
MONOCYTES NFR BLD AUTO: 6 %
NEUTROPHILS # BLD AUTO: 6.9 10E3/UL (ref 1.6–8.3)
NEUTROPHILS NFR BLD AUTO: 72 %
NITRATE UR QL: NEGATIVE
PH UR STRIP: 7 [PH] (ref 5–7)
PLATELET # BLD AUTO: 208 10E3/UL (ref 150–450)
RBC # BLD AUTO: 4.11 10E6/UL (ref 3.8–5.2)
RUBV IGG SERPL QL IA: 2.08 INDEX
RUBV IGG SERPL QL IA: POSITIVE
SP GR UR STRIP: 1.02 (ref 1–1.03)
T PALLIDUM AB SER QL: NONREACTIVE
UROBILINOGEN UR STRIP-ACNC: 0.2 E.U./DL
WBC # BLD AUTO: 9.6 10E3/UL (ref 4–11)

## 2025-02-24 PROCEDURE — 81003 URINALYSIS AUTO W/O SCOPE: CPT | Performed by: OBSTETRICS & GYNECOLOGY

## 2025-02-24 PROCEDURE — 84156 ASSAY OF PROTEIN URINE: CPT | Performed by: OBSTETRICS & GYNECOLOGY

## 2025-02-24 PROCEDURE — 84443 ASSAY THYROID STIM HORMONE: CPT | Performed by: OBSTETRICS & GYNECOLOGY

## 2025-02-24 PROCEDURE — 87086 URINE CULTURE/COLONY COUNT: CPT | Performed by: OBSTETRICS & GYNECOLOGY

## 2025-02-24 PROCEDURE — 86803 HEPATITIS C AB TEST: CPT | Performed by: OBSTETRICS & GYNECOLOGY

## 2025-02-24 PROCEDURE — 83036 HEMOGLOBIN GLYCOSYLATED A1C: CPT | Performed by: OBSTETRICS & GYNECOLOGY

## 2025-02-24 PROCEDURE — 99213 OFFICE O/P EST LOW 20 MIN: CPT | Performed by: OBSTETRICS & GYNECOLOGY

## 2025-02-24 PROCEDURE — 80061 LIPID PANEL: CPT | Performed by: OBSTETRICS & GYNECOLOGY

## 2025-02-24 PROCEDURE — G2211 COMPLEX E/M VISIT ADD ON: HCPCS | Performed by: OBSTETRICS & GYNECOLOGY

## 2025-02-24 PROCEDURE — 36415 COLL VENOUS BLD VENIPUNCTURE: CPT | Performed by: OBSTETRICS & GYNECOLOGY

## 2025-02-24 PROCEDURE — 86901 BLOOD TYPING SEROLOGIC RH(D): CPT | Performed by: OBSTETRICS & GYNECOLOGY

## 2025-02-24 PROCEDURE — 80053 COMPREHEN METABOLIC PANEL: CPT | Performed by: OBSTETRICS & GYNECOLOGY

## 2025-02-24 PROCEDURE — 86762 RUBELLA ANTIBODY: CPT | Performed by: OBSTETRICS & GYNECOLOGY

## 2025-02-24 PROCEDURE — 87389 HIV-1 AG W/HIV-1&-2 AB AG IA: CPT | Performed by: OBSTETRICS & GYNECOLOGY

## 2025-02-24 PROCEDURE — 86850 RBC ANTIBODY SCREEN: CPT | Performed by: OBSTETRICS & GYNECOLOGY

## 2025-02-24 PROCEDURE — 87340 HEPATITIS B SURFACE AG IA: CPT | Performed by: OBSTETRICS & GYNECOLOGY

## 2025-02-24 PROCEDURE — 86780 TREPONEMA PALLIDUM: CPT | Performed by: OBSTETRICS & GYNECOLOGY

## 2025-02-24 PROCEDURE — 85025 COMPLETE CBC W/AUTO DIFF WBC: CPT | Performed by: OBSTETRICS & GYNECOLOGY

## 2025-02-24 PROCEDURE — 86900 BLOOD TYPING SEROLOGIC ABO: CPT | Performed by: OBSTETRICS & GYNECOLOGY

## 2025-02-24 RX ORDER — ONDANSETRON 4 MG/1
4 TABLET, ORALLY DISINTEGRATING ORAL EVERY 6 HOURS PRN
Qty: 60 TABLET | Refills: 2 | Status: SHIPPED | OUTPATIENT
Start: 2025-02-24

## 2025-02-24 NOTE — PROGRESS NOTES
CC: New Ob visit  HPI: Christian Lomeli is a 39 year old  here for new Ob visit.  Patient's last menstrual period was 2024..  She is 9w0d by known LMP and c/w 8wk us, giving her an EDC of 25.  The pregnancy was planned and she and her  are feeling excited.    This far the pregnancy has been complicated by nausea and disruptive fatigue.    Her previous pregnancy was complicated byt pre-e with SF diagnosed at term and LTCS due to cat 2 RFD.  She has already started daily baby ASA anf desires repeat .    Past Medical History:   Diagnosis Date    Anxiety     Depressive disorder 10/28/2022    Not feeling any mild-severe symptoms currently    Depressive disorder 10/28/2022    Not feeling any mild-severe symptoms currently    Varicella        Past Surgical History:   Procedure Laterality Date    ABDOMEN SURGERY  3/16/23    Csection     SECTION N/A 2023    Procedure:  section;  Surgeon: Aracelis Buchanan MD;  Location: [facility]    wisdom tooth removal       OB History    Para Term  AB Living   2 1 1 0 0 1   SAB IAB Ectopic Multiple Live Births   0 0 0 0 1      # Outcome Date GA Lbr Radu/2nd Weight Sex Type Anes PTL Lv   2 Current            1 Term 23 39w1d  3.062 kg (6 lb 12 oz) M CS-LTranv Spinal N JOCELYNN      Complications: Fetal Intolerance      Name: TIFFANI LOMELI      Apgar1: 9  Apgar5: 9       Current Outpatient Medications:     aspirin 81 MG EC tablet, , Disp: , Rfl:     hydrOXYzine (ATARAX) 25 MG tablet, Take 1 tablet (25 mg) by mouth 3 times daily as needed for itching or anxiety, Disp: 30 tablet, Rfl: 1    ondansetron (ZOFRAN ODT) 4 MG ODT tab, Take 1 tablet (4 mg) by mouth every 6 hours as needed for nausea., Disp: 60 tablet, Rfl: 2    Prenatal Vit-Fe Fumarate-FA (PRENATAL VITAMIN PO), Take by mouth., Disp: , Rfl:     venlafaxine (EFFEXOR XR) 75 MG 24 hr capsule, TAKE 1 CAPSULE BY MOUTH EVERY DAY, Disp: 90 capsule, Rfl: 3     ketoconazole (NIZORAL) 2 % external shampoo, 5 to 10 mL of shampoo should be left on for three to five minutes before rinsing off. Use two to three times per week  four weeks (Patient not taking: Reported on 2/24/2025), Disp: 120 mL, Rfl: 1    Allergies   Allergen Reactions    Blood Transfusion Related (Informational Only) Other (See Comments)     Notified of delay in availability of crossmatched red blood cells due to positive antibody screen 8/25/22; negative antibody screeen 3/16/2023    Seasonal Allergies        Family History   Problem Relation Age of Onset    Depression Mother     Anxiety Disorder Mother     Obesity Mother     No Known Problems Father     Hypertension Maternal Grandmother     Depression Maternal Grandmother     Anxiety Disorder Maternal Grandmother     Dementia Maternal Grandmother     Hypertension Maternal Grandfather     Other Cancer Maternal Grandfather     No Known Problems Paternal Grandmother     No Known Problems Paternal Grandfather     Breast Cancer Other         Maternal Aunt       Past medical, social, surgical and family history were reviewed and updated in EPIC.    ROS: No TIA's or unusual headaches, no dysphagia.  No prolonged cough. No dyspnea or chest pain on exertion.  No abdominal pain, change in bowel habits, black or bloody stools.  No urinary tract symptoms.  No new or unusual musculoskeletal symptoms.  Normal menses, no abnormal vaginal bleeding, discharge or unexpected pelvic pain. No new breast lumps, breast pain or nipple discharge.    PE: /70   Pulse 82   Temp 97.3  F (36.3  C)   Wt 80.3 kg (177 lb 1.6 oz)   LMP 12/23/2024   SpO2 98%   BMI 28.58 kg/m      Gen: Healthy appearing female in no acute distress  Heart: RRR  Lungs: CTAB  Abd: +BS, SNT  Ex: No C/C/E, no suspicious rashes or lesions    A/P:  1) IUP at 9w0d, AMA, h/o pre-e with SF, h/o LTCS        PNL today, pap UTD        Reviewed anticipated course of prenatal care        Reviewed recommendations  for weight gain, activity and diet        Rx zofran faxed        We discussed options for genetic screening and diagnosis including CVS/amnio, first trimester screen and quad screen.  We discussed a fetal survey will be performed around 18-20 weeks. She is scheduled for genetic screening, plan level 2         Discussed increased risk of pre-e due to h/o pre-e and AMA, already taking daily baby ASA         She desires repeat , discussed plan for delivery at 39w, earlier if indicated         Discussed MD call schedule as well as role of residents and med students both in clinic and hospital.  She is ok with resident care        RTC 4 weeks    Isatu Baugh MD

## 2025-02-25 LAB
ALBUMIN MFR UR ELPH: <6 MG/DL
ALBUMIN SERPL BCG-MCNC: 4.1 G/DL (ref 3.5–5.2)
ALP SERPL-CCNC: 51 U/L (ref 40–150)
ALT SERPL W P-5'-P-CCNC: 19 U/L (ref 0–50)
ANION GAP SERPL CALCULATED.3IONS-SCNC: 11 MMOL/L (ref 7–15)
AST SERPL W P-5'-P-CCNC: 18 U/L (ref 0–45)
BACTERIA UR CULT: NORMAL
BILIRUB SERPL-MCNC: 0.3 MG/DL
BUN SERPL-MCNC: 14.3 MG/DL (ref 6–20)
CALCIUM SERPL-MCNC: 9.4 MG/DL (ref 8.8–10.4)
CHLORIDE SERPL-SCNC: 102 MMOL/L (ref 98–107)
CHOLEST SERPL-MCNC: 227 MG/DL
CREAT SERPL-MCNC: 0.69 MG/DL (ref 0.51–0.95)
CREAT UR-MCNC: 93.2 MG/DL
EGFRCR SERPLBLD CKD-EPI 2021: >90 ML/MIN/1.73M2
FASTING STATUS PATIENT QL REPORTED: YES
FASTING STATUS PATIENT QL REPORTED: YES
GLUCOSE SERPL-MCNC: 83 MG/DL (ref 70–99)
HBV SURFACE AG SERPL QL IA: NONREACTIVE
HCO3 SERPL-SCNC: 22 MMOL/L (ref 22–29)
HCV AB SERPL QL IA: NONREACTIVE
HDLC SERPL-MCNC: 84 MG/DL
HIV 1+2 AB+HIV1 P24 AG SERPL QL IA: NONREACTIVE
LDLC SERPL CALC-MCNC: 130 MG/DL
NONHDLC SERPL-MCNC: 143 MG/DL
POTASSIUM SERPL-SCNC: 5.2 MMOL/L (ref 3.4–5.3)
PROT SERPL-MCNC: 6.7 G/DL (ref 6.4–8.3)
PROT/CREAT 24H UR: NORMAL MG/G{CREAT}
SODIUM SERPL-SCNC: 135 MMOL/L (ref 135–145)
TRIGL SERPL-MCNC: 67 MG/DL
TSH SERPL DL<=0.005 MIU/L-ACNC: 1.19 UIU/ML (ref 0.3–4.2)

## 2025-02-25 NOTE — RESULT ENCOUNTER NOTE
Gerson Gonzales,     Overall labs look great.  Improved cholesterol levels.  Stable blood counts, electrolytes, kidney function, and liver function.  Normal thyroid.    I hope all is going well with the pregnancy!     Let me know if you have any questions or concerns,     Aj Heart PA-C  [facility]

## 2025-03-18 ENCOUNTER — PRE VISIT (OUTPATIENT)
Dept: MATERNAL FETAL MEDICINE | Facility: CLINIC | Age: 40
End: 2025-03-18
Payer: COMMERCIAL

## 2025-03-21 ENCOUNTER — LAB (OUTPATIENT)
Dept: LAB | Facility: CLINIC | Age: 40
End: 2025-03-21
Attending: OBSTETRICS & GYNECOLOGY
Payer: COMMERCIAL

## 2025-03-21 ENCOUNTER — HOSPITAL ENCOUNTER (OUTPATIENT)
Dept: ULTRASOUND IMAGING | Facility: CLINIC | Age: 40
Discharge: HOME | End: 2025-03-21
Attending: OBSTETRICS & GYNECOLOGY
Payer: COMMERCIAL

## 2025-03-21 DIAGNOSIS — O26.90 PREGNANCY RELATED CONDITION, ANTEPARTUM: ICD-10-CM

## 2025-03-21 DIAGNOSIS — O09.521 SUPERVISION OF ELDERLY MULTIGRAVIDA IN FIRST TRIMESTER: ICD-10-CM

## 2025-03-21 PROCEDURE — 76813 OB US NUCHAL MEAS 1 GEST: CPT

## 2025-03-21 PROCEDURE — 36415 COLL VENOUS BLD VENIPUNCTURE: CPT

## 2025-03-21 PROCEDURE — 76813 OB US NUCHAL MEAS 1 GEST: CPT | Mod: 26 | Performed by: OBSTETRICS & GYNECOLOGY

## 2025-03-30 LAB — SCANNED LAB RESULT: NORMAL

## 2025-03-31 ENCOUNTER — TELEPHONE (OUTPATIENT)
Dept: MATERNAL FETAL MEDICINE | Facility: CLINIC | Age: 40
End: 2025-03-31
Payer: COMMERCIAL

## 2025-03-31 NOTE — TELEPHONE ENCOUNTER
March 31, 2025     I spoke with Christian Nuñez regarding her NIPT results. Christian reports that she and her partner did look at the genetic testing report together last night and found out they were having a boy!      Results indicate NO ANEUPLOIDY DETECTED for chromosomes 21, 18, 13, or the sex chromosomes (XY). The screen also returned low risk for the screened microdeletion syndromes: 22q11.2 deletion, 15q11.2 deletion, 1p36 deletion, 4p, and 5p deletion syndromes.     This puts her current pregnancy at low risk for Down syndrome, trisomy 18, trisomy 13, sex chromosome abnormalities, and the screened microdeletion syndromes. This test is reported to have the following sensitivities: Down syndrome- >99.7%, trisomy 18- >97.9%, and trisomy 13- >99.0%. Although these results are reassuring, this does not replace a standard chromosome analysis from a chorionic villus sampling or amniocentesis.       MSAFP is the appropriate second trimester screening test for open neural tube defects; the maternal quad screen is not recommended.     Her results are available in her Epic chart for her primary OB to review.     Helen Patel MS, [facility]  Certified and Licensed Genetic Counselor  [facility]  Maternal Fetal Medicine  Office: [phone]  [facility]: [phone]   Fax: [phone]  [facility]

## 2025-04-22 ENCOUNTER — PRENATAL OFFICE VISIT (OUTPATIENT)
Dept: OBGYN | Facility: CLINIC | Age: 40
End: 2025-04-22
Payer: COMMERCIAL

## 2025-04-22 VITALS
HEART RATE: 80 BPM | OXYGEN SATURATION: 99 % | WEIGHT: 179.7 LBS | SYSTOLIC BLOOD PRESSURE: 135 MMHG | DIASTOLIC BLOOD PRESSURE: 78 MMHG | BODY MASS INDEX: 28.88 KG/M2 | TEMPERATURE: 97.9 F | HEIGHT: 66 IN

## 2025-04-22 DIAGNOSIS — Z34.82 ENCOUNTER FOR SUPERVISION OF OTHER NORMAL PREGNANCY IN SECOND TRIMESTER: Primary | ICD-10-CM

## 2025-04-22 PROCEDURE — 99000 SPECIMEN HANDLING OFFICE-LAB: CPT | Performed by: OBSTETRICS & GYNECOLOGY

## 2025-04-22 PROCEDURE — 3075F SYST BP GE 130 - 139MM HG: CPT | Performed by: OBSTETRICS & GYNECOLOGY

## 2025-04-22 PROCEDURE — 99207 PR PRENATAL VISIT: CPT | Performed by: OBSTETRICS & GYNECOLOGY

## 2025-04-22 PROCEDURE — 82105 ALPHA-FETOPROTEIN SERUM: CPT | Mod: 90 | Performed by: OBSTETRICS & GYNECOLOGY

## 2025-04-22 PROCEDURE — 0502F SUBSEQUENT PRENATAL CARE: CPT | Performed by: OBSTETRICS & GYNECOLOGY

## 2025-04-22 PROCEDURE — 36415 COLL VENOUS BLD VENIPUNCTURE: CPT | Performed by: OBSTETRICS & GYNECOLOGY

## 2025-04-22 PROCEDURE — 3078F DIAST BP <80 MM HG: CPT | Performed by: OBSTETRICS & GYNECOLOGY

## 2025-04-22 NOTE — PATIENT INSTRUCTIONS
"Weeks 14 to 18 of Your Pregnancy: Care Instructions  Around this time, you may start to look pregnant. Your baby is now able to pass urine. And the first stool (meconium) is starting to collect in your baby's intestines. Hair is starting to grow on your baby's head.    You may notice some skin changes, such as itchy spots on your palms or acne on your face.   At your next doctor visit, you may have an ultrasound. So you might think about whether you want to know the sex of your baby. Also ask your doctor about flu and COVID-19 shots.      How to reduce stress   Ask for help when you need it.  Try to avoid things that cause you stress.  Seek out things that relieve stress, such as breathing exercises or yoga.     How to get exercise   If you don't usually exercise, start slowly. Short walks may be a good choice.  Try to be active 30 minutes a day, at least 5 days a week.  Avoid activities where you're more likely to fall.  Use light weights to reduce stress on your joints.     How to stay at a healthy weight for you   Talk to your doctor or midwife about how much weight you should gain.  It's generally best to gain:  About 28 to 40 pounds if you're underweight.  About 25 to 35 pounds if you're at a healthy weight.  About 15 to 25 pounds if you're overweight.  About 11 to 20 pounds if you're very overweight (obese).  Follow-up care is a key part of your treatment and safety. Be sure to make and go to all appointments, and call your doctor if you are having problems. It's also a good idea to know your test results and keep a list of the medicines you take.  Where can you learn more?  Go to https://www.[employer].net/patiented  Enter I453 in the search box to learn more about \"Weeks 14 to 18 of Your Pregnancy: Care Instructions.\"  Current as of: April 30, 2024  Content Version: 14.4    3614-0584 [employer].   Care instructions adapted under license by your healthcare professional. If you have questions about " a medical condition or this instruction, always ask your healthcare professional. [employer] disclaims any warranty or liability for your use of this information.    Second-Trimester Fetal Ultrasound: About This Test  What is it?     Fetal ultrasound is a test that uses sound waves to make pictures of your baby (fetus) and placenta inside the uterus. The test is the safest way to find out the age, size, and position of your baby. You also may be able to find out the sex of your baby. (But the test isn't done just to find out a baby's sex.)  No known risks to the mother or the baby are linked to fetal ultrasound. But you may feel anxious if the test reveals a problem with your pregnancy or baby.  Why is this test done?  In the second trimester, a fetal ultrasound is done to:  Estimate the number of weeks and days a fetus has developed since the beginning of the pregnancy. This is called the gestational age.  Look at the size and position of the fetus, the placenta, and the fluid that surrounds the fetus.  Find major birth defects, such as heart problems or problems with the brain and spinal cord (neural tube defects). But the test may not be able to find many minor defects and some major birth defects.  How do you prepare for the test?  In general, there's nothing you have to do before this test, unless your doctor tells you to.  How is the test done?  You may be able to leave your clothes on, or you will be given a gown to wear.  You will lie on your back on a padded examination table.  A gel will be spread on your belly. It will be removed after the test.  A small, handheld device called a transducer will be pressed against the gel on your skin and moved across your belly several times.  You may watch the monitor to see the picture of your baby during the test.  What happens after the test?  You will probably be able to go home right away.  You most likely will be able to go back to your usual  "activities right away.  Follow-up care is a key part of your treatment and safety. Be sure to make and go to all appointments, and call your doctor if you are having problems. It's also a good idea to keep a list of the medicines you take. Ask your doctor when you can expect to have your test results.  Where can you learn more?  Go to https://www.[employer]/patiented  Enter Y671 in the search box to learn more about \"Second-Trimester Fetal Ultrasound: About This Test.\"  Current as of: April 30, 2024  Content Version: 14.4    9597-3808 [employer].   Care instructions adapted under license by your healthcare professional. If you have questions about a medical condition or this instruction, always ask your healthcare professional. [employer] disclaims any warranty or liability for your use of this information.    During Pregnancy: Exercises  Introduction  Here are some examples of exercises to do during your pregnancy. Start each exercise slowly. Ease off the exercise if you start to have pain.  Talk to your doctor about when you can start these exercises and which ones will work best for you.  How to do the exercises  Neck rotation    Sit up straight in a firm chair, or stand up straight. If you're standing, keep your feet about hip-width apart.  Keeping your chin level, turn your head to the right and hold for 15 to 30 seconds.  Turn your head to the left and hold for 15 to 30 seconds.  Repeat 2 to 4 times.  Neck stretch to the front    Sit up straight in a firm chair, or stand up straight. Look straight ahead. If you're standing, keep your feet about hip-width apart.  Slowly bend your head forward without moving your shoulders.  Hold for 15 to 30 seconds, then return to your starting position.  Repeat 2 to 4 times.  Back press    Stand with your back 10 to 12 inches away from a wall.  Lean into the wall until your back is against it. Press your lower back against the wall by pulling in " your stomach muscles.  Slowly slide down until your knees are slightly bent, pressing your lower back against the wall.  Hold for at least 6 seconds, then slide back up the wall.  Repeat 8 to 12 times.  Over time, work up to holding this position for as much as 1 minute.  Trunk twist    Sit on the floor with your legs crossed. If that's not comfortable, you can sit on a folded blanket so your bottom is a few inches off the floor. Or you can sit on a chair with your knees hip-width apart and your feet flat on the floor.  Reach your left hand toward your right knee. You can place your right hand at your side for support.  Slowly twist your body (trunk) to your right.  Relax and return to your starting position.  Repeat 2 to 4 times.  Switch your hands and twist to your left.  Repeat 2 to 4 times.  Pelvic rocking on hands and knees    Start on your hands and knees. Place your wrists directly below your shoulders and your knees below your hips.  Breathe in slowly. Tuck your head downward and round your back up, making a curve with your back in the shape of the letter C. Hold this position for about 6 seconds.  Breathe out slowly and bring your head back up. Relax, keeping your back straight. (Don't allow it to curve toward the floor.) Hold for about 6 seconds.  Repeat 8 to 12 times, gently rocking your pelvis.  Pelvic tilt    Lie on your back with your knees bent and your feet flat on the floor.  Tighten your belly muscles by pulling your belly button in toward your spine. Press your lower back to the floor. You should feel your hips and pelvis rock back.  Hold for 6 seconds while breathing smoothly, and then relax.  Repeat 8 to 12 times.  Do this exercise only during the first 4 months of pregnancy. After this point, lying on your back is not recommended, because it can cause blood flow problems for you and your baby.  Backward stretch    Start on your hands and knees with your knees 8 to 10 inches apart, hands  directly below your shoulders, and arms and back straight.  Keeping your arms straight, slowly lower your buttocks toward your heels and tuck your head toward your knees. Hold for 15 to 30 seconds.  Slowly return to the starting position.  Repeat 2 to 4 times.  Forward bend    Sit comfortably in a chair, with your arms relaxed.  Slowly bend forward, allowing your arms to hang down. Lean only as far as you can without feeling discomfort or pressure on your belly.  Hold for 15 to 30 seconds and then slowly sit up straight.  Repeat 2 to 4 times or to your comfort level.  Donkey kick    Start on your hands and knees. Place your hands directly below your shoulders, and keep your arms straight.  Tighten your belly muscles by pulling your belly button in toward your spine. Keep breathing normally, and don't hold your breath.  Lift one knee and bring it toward your elbow.  Slowly extend that leg behind you without completely straightening it. Be careful not to let your hip drop down. Avoid arching your back.  Hold your leg behind you for about 6 seconds.  Return to your starting position.  Repeat 8 to 12 times for each leg.  Tailor sitting    Sit on the floor.  Bring your feet close to your body while crossing your ankles.  Keep your back straight. Relax your legs and let your knees drop toward the floor.  Hold this position for as long as you are comfortable.  Toe reach    Sit on the floor with your back straight, legs about 12 inches apart, and feet relaxed outward.  Stretch your hands forward toward your right foot, then sit up.  Stretch your hands straight forward, then sit up.  Stretch your hands forward toward your left foot, then sit up.  Hold each stretch for 15 to 30 seconds.  Repeat 2 to 4 times.  Follow-up care is a key part of your treatment and safety. Be sure to make and go to all appointments, and call your doctor if you are having problems. It's also a good idea to know your test results and keep a list of  the medicines you take.  Current as of: April 30, 2024  Content Version: 14.4    9335-4635 [employer].   Care instructions adapted under license by your healthcare professional. If you have questions about a medical condition or this instruction, always ask your healthcare professional. [employer] disclaims any warranty or liability for your use of this information.

## 2025-04-22 NOTE — PROGRESS NOTES
17w1d overall feeling ok, most first tri symptoms have improved, still feeling tired.  Discussed.  Not sure if she's feeling mvmt yet or not.  Having another boy, a little disappointed but happy.  AFP today, level 2 scheduled next week.  RTC 4 weeks  [place]

## 2025-04-24 LAB
# FETUSES US: NORMAL
AFP MOM SERPL: 1.7
AFP SERPL-MCNC: 60 NG/ML
AGE - REPORTED: 40.1 YR
CURRENT SMOKER: NO
FAMILY MEMBER DISEASES HX: NO
GA METHOD: NORMAL
GA: NORMAL WK
IDDM PATIENT QL: NO
INTEGRATED SCN PATIENT-IMP: NORMAL
SPECIMEN DRAWN SERPL: NORMAL

## 2025-05-02 ENCOUNTER — HOSPITAL ENCOUNTER (OUTPATIENT)
Dept: ULTRASOUND IMAGING | Facility: CLINIC | Age: 40
Discharge: HOME | End: 2025-05-02
Attending: OBSTETRICS & GYNECOLOGY
Payer: COMMERCIAL

## 2025-05-02 DIAGNOSIS — O09.529 ANTEPARTUM MULTIGRAVIDA OF ADVANCED MATERNAL AGE: ICD-10-CM

## 2025-05-02 PROCEDURE — 76811 OB US DETAILED SNGL FETUS: CPT

## 2025-05-02 PROCEDURE — 76811 OB US DETAILED SNGL FETUS: CPT | Mod: 26 | Performed by: OBSTETRICS & GYNECOLOGY

## 2025-05-02 PROCEDURE — 99213 OFFICE O/P EST LOW 20 MIN: CPT | Mod: 25 | Performed by: OBSTETRICS & GYNECOLOGY

## 2025-06-13 ENCOUNTER — RESULTS FOLLOW-UP (OUTPATIENT)
Dept: OBGYN | Facility: CLINIC | Age: 40
End: 2025-06-13

## 2025-06-16 DIAGNOSIS — L98.9 SKIN LESION: Primary | ICD-10-CM

## 2025-06-16 NOTE — PROGRESS NOTES
Derm eval recc two skin lesions one on right breast, reports changing in color, not homogenous.  There is a raised warty looking lesion on back left scapula that is also changing.

## 2025-06-17 ENCOUNTER — PATIENT OUTREACH (OUTPATIENT)
Dept: CARE COORDINATION | Facility: CLINIC | Age: 40
End: 2025-06-17
Payer: COMMERCIAL

## 2025-06-19 ENCOUNTER — PATIENT OUTREACH (OUTPATIENT)
Dept: CARE COORDINATION | Facility: CLINIC | Age: 40
End: 2025-06-19
Payer: COMMERCIAL

## 2025-07-11 ENCOUNTER — HOSPITAL ENCOUNTER (OUTPATIENT)
Dept: ULTRASOUND IMAGING | Facility: CLINIC | Age: 40
Discharge: HOME | End: 2025-07-11
Attending: OBSTETRICS & GYNECOLOGY
Payer: COMMERCIAL

## 2025-07-11 DIAGNOSIS — O09.529 ANTEPARTUM MULTIGRAVIDA OF ADVANCED MATERNAL AGE: ICD-10-CM

## 2025-07-11 PROCEDURE — 76816 OB US FOLLOW-UP PER FETUS: CPT

## 2025-07-14 ENCOUNTER — MYC MEDICAL ADVICE (OUTPATIENT)
Dept: OBGYN | Facility: CLINIC | Age: 40
End: 2025-07-14

## 2025-07-14 ENCOUNTER — TELEPHONE (OUTPATIENT)
Dept: OBGYN | Facility: CLINIC | Age: 40
End: 2025-07-14
Payer: COMMERCIAL

## 2025-07-14 NOTE — TELEPHONE ENCOUNTER
Type of surgery: ob  Location of surgery: [facility] OR  Date and time of surgery: 09/24/2025 8:30AM  Surgeon: Dr. Aracelis Buchanan  Pre-Op Appt Date: surgeon  Post-Op Appt Date: 6 weeks, will schedule later   Packet sent out: Yes  Pre-cert/Authorization completed:  Not Applicable  Date: 07/14/2025    Pt would like to move to 9/22 at 8:30AM if it becomes available

## 2025-07-22 ENCOUNTER — TELEPHONE (OUTPATIENT)
Dept: OBGYN | Facility: CLINIC | Age: 40
End: 2025-07-22
Payer: COMMERCIAL

## 2025-07-22 NOTE — TELEPHONE ENCOUNTER
30w1d     Has been googling so psyching herself out  Feeling nauseous, dizzy, maybe heart burn    Takes effexor in the AM, isnt too sure if she took it yesterday so not sure if dizziness is from that. But she took it today so will monitor.     Since Friday has been feeling nauseous   Had aversions in the first tri so maybe that's what's coming back.     Reviewed that BP can sometimes dip in the middle of pregnancy and also with a growing baby pushing on the stomach and esophagus, some nausea and heartburn can be normal. Discussed increasing water intake, small meals/snacks, tums are okay, take time for transitions.   Reviewed signs and symptoms of pre-eclampsia too.   Patient appreciated the review. Next prenatal is , encouraged ptn to try some lifestyle adjustments and let us know if she is not feeling better before the weekend.     Maura FREITAS RN   [facility] Triage RN

## 2025-07-22 NOTE — TELEPHONE ENCOUNTER
Caller reporting the following red-flag symptom(s): extremely dizzy, nauseous, bad heartburn; GA: 30w1d    Per the system red-flag symptom policy, patient was instructed to:  speak with a Registered Nurse    Action:  Patient warm transferred to a Registered Nurse

## 2025-07-27 NOTE — PROGRESS NOTES
31w0d  Feeling well. Had a dizzy spell last week and got worried that she was developing preeclampsia from googling. Has been taking blood pressures at home, which have been normal. Reviewed s/s pree and when to call RN line. Feeling more depression this pregnancy with grief and loss of her grandmother right before getting pregnant. Not going back to work after this baby. Will try prenatal massage and getting together with mom groups. 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    RTC 2 weeks  LARRY Tong CNP

## 2025-07-28 ENCOUNTER — PRENATAL OFFICE VISIT (OUTPATIENT)
Dept: OBGYN | Facility: CLINIC | Age: 40
End: 2025-07-28
Payer: COMMERCIAL

## 2025-07-28 VITALS
WEIGHT: 196.7 LBS | OXYGEN SATURATION: 99 % | HEART RATE: 82 BPM | BODY MASS INDEX: 31.75 KG/M2 | SYSTOLIC BLOOD PRESSURE: 124 MMHG | DIASTOLIC BLOOD PRESSURE: 79 MMHG

## 2025-07-28 DIAGNOSIS — O09.523 ADVANCED MATERNAL AGE IN MULTIGRAVIDA, THIRD TRIMESTER: Primary | ICD-10-CM

## 2025-07-28 PROCEDURE — 3074F SYST BP LT 130 MM HG: CPT

## 2025-07-28 PROCEDURE — 99207 PR PRENATAL VISIT: CPT

## 2025-07-28 PROCEDURE — 3078F DIAST BP <80 MM HG: CPT

## 2025-07-28 PROCEDURE — 0502F SUBSEQUENT PRENATAL CARE: CPT

## 2025-07-28 RX ORDER — CLOBETASOL PROPIONATE 0.5 MG/ML
SOLUTION TOPICAL
COMMUNITY
Start: 2025-06-24

## 2025-08-08 ENCOUNTER — HOSPITAL ENCOUNTER (OUTPATIENT)
Dept: ULTRASOUND IMAGING | Facility: CLINIC | Age: 40
Discharge: HOME | End: 2025-08-08
Attending: OBSTETRICS & GYNECOLOGY
Payer: COMMERCIAL

## 2025-08-08 DIAGNOSIS — O09.529 ANTEPARTUM MULTIGRAVIDA OF ADVANCED MATERNAL AGE: ICD-10-CM

## 2025-08-08 PROCEDURE — 76816 OB US FOLLOW-UP PER FETUS: CPT

## 2025-08-18 ENCOUNTER — PRENATAL OFFICE VISIT (OUTPATIENT)
Dept: OBGYN | Facility: CLINIC | Age: 40
End: 2025-08-18
Payer: COMMERCIAL

## 2025-08-18 VITALS
WEIGHT: 204 LBS | SYSTOLIC BLOOD PRESSURE: 133 MMHG | BODY MASS INDEX: 32.93 KG/M2 | DIASTOLIC BLOOD PRESSURE: 81 MMHG | OXYGEN SATURATION: 97 % | HEART RATE: 80 BPM

## 2025-08-18 DIAGNOSIS — O09.523 AMA (ADVANCED MATERNAL AGE) MULTIGRAVIDA 35+, THIRD TRIMESTER: Primary | ICD-10-CM

## 2025-08-18 PROCEDURE — 99207 PR PRENATAL VISIT: CPT

## 2025-08-18 PROCEDURE — 3079F DIAST BP 80-89 MM HG: CPT

## 2025-08-18 PROCEDURE — 0502F SUBSEQUENT PRENATAL CARE: CPT

## 2025-08-18 PROCEDURE — 3075F SYST BP GE 130 - 139MM HG: CPT

## 2025-08-31 ENCOUNTER — HEALTH MAINTENANCE LETTER (OUTPATIENT)
Age: 40
End: 2025-08-31

## 2025-09-01 ENCOUNTER — APPOINTMENT (OUTPATIENT)
Dept: GENERAL RADIOLOGY | Facility: CLINIC | Age: 40
End: 2025-09-01
Payer: COMMERCIAL

## 2025-09-01 ENCOUNTER — NURSE TRIAGE (OUTPATIENT)
Dept: NURSING | Facility: CLINIC | Age: 40
End: 2025-09-01
Payer: COMMERCIAL

## 2025-09-01 ENCOUNTER — HOSPITAL ENCOUNTER (OUTPATIENT)
Facility: CLINIC | Age: 40
Discharge: HOME | End: 2025-09-01
Attending: OBSTETRICS & GYNECOLOGY | Admitting: OBSTETRICS & GYNECOLOGY
Payer: COMMERCIAL

## 2025-09-01 VITALS
RESPIRATION RATE: 18 BRPM | SYSTOLIC BLOOD PRESSURE: 134 MMHG | DIASTOLIC BLOOD PRESSURE: 80 MMHG | OXYGEN SATURATION: 97 % | TEMPERATURE: 97.7 F

## 2025-09-01 PROBLEM — Z36.89 ENCOUNTER FOR TRIAGE IN PREGNANT PATIENT: Status: ACTIVE | Noted: 2025-09-01

## 2025-09-01 LAB
ALBUMIN MFR UR ELPH: <6 MG/DL
ALT SERPL W P-5'-P-CCNC: 27 U/L (ref 0–50)
AST SERPL W P-5'-P-CCNC: 21 U/L (ref 0–45)
CREAT SERPL-MCNC: 0.66 MG/DL (ref 0.51–0.95)
CREAT UR-MCNC: 51.2 MG/DL
EGFRCR SERPLBLD CKD-EPI 2021: >90 ML/MIN/1.73M2
ERYTHROCYTE [DISTWIDTH] IN BLOOD BY AUTOMATED COUNT: 13.3 % (ref 10–15)
HCT VFR BLD AUTO: 33.7 % (ref 35–47)
HGB BLD-MCNC: 11.9 G/DL (ref 11.7–15.7)
MCH RBC QN AUTO: 33.8 PG (ref 26.5–33)
MCHC RBC AUTO-ENTMCNC: 35.3 G/DL (ref 31.5–36.5)
MCV RBC AUTO: 95.7 FL (ref 78–100)
PLATELET # BLD AUTO: 145 10E3/UL (ref 150–450)
PROT/CREAT 24H UR: NORMAL MG/G{CREAT}
RBC # BLD AUTO: 3.52 10E6/UL (ref 3.8–5.2)
WBC # BLD AUTO: 8.45 10E3/UL (ref 4–11)

## 2025-09-01 PROCEDURE — 84156 ASSAY OF PROTEIN URINE: CPT | Performed by: OBSTETRICS & GYNECOLOGY

## 2025-09-01 PROCEDURE — 36415 COLL VENOUS BLD VENIPUNCTURE: CPT | Performed by: OBSTETRICS & GYNECOLOGY

## 2025-09-01 PROCEDURE — 71045 X-RAY EXAM CHEST 1 VIEW: CPT | Mod: 26 | Performed by: STUDENT IN AN ORGANIZED HEALTH CARE EDUCATION/TRAINING PROGRAM

## 2025-09-01 PROCEDURE — 82565 ASSAY OF CREATININE: CPT | Performed by: OBSTETRICS & GYNECOLOGY

## 2025-09-01 PROCEDURE — 71045 X-RAY EXAM CHEST 1 VIEW: CPT

## 2025-09-01 PROCEDURE — G0463 HOSPITAL OUTPT CLINIC VISIT: HCPCS | Mod: 6MC

## 2025-09-01 PROCEDURE — 84450 TRANSFERASE (AST) (SGOT): CPT | Performed by: OBSTETRICS & GYNECOLOGY

## 2025-09-01 PROCEDURE — 85027 COMPLETE CBC AUTOMATED: CPT | Performed by: OBSTETRICS & GYNECOLOGY

## 2025-09-01 PROCEDURE — 84460 ALANINE AMINO (ALT) (SGPT): CPT | Performed by: OBSTETRICS & GYNECOLOGY

## 2025-09-01 ASSESSMENT — ACTIVITIES OF DAILY LIVING (ADL)
ADLS_ACUITY_SCORE: 44

## 2025-09-02 ENCOUNTER — TELEPHONE (OUTPATIENT)
Dept: OBGYN | Facility: CLINIC | Age: 40
End: 2025-09-02
Payer: COMMERCIAL

## 2025-09-03 ENCOUNTER — PRENATAL OFFICE VISIT (OUTPATIENT)
Dept: OBGYN | Facility: CLINIC | Age: 40
End: 2025-09-03
Payer: COMMERCIAL

## 2025-09-03 VITALS
WEIGHT: 204 LBS | BODY MASS INDEX: 32.78 KG/M2 | SYSTOLIC BLOOD PRESSURE: 142 MMHG | TEMPERATURE: 97.3 F | OXYGEN SATURATION: 99 % | HEIGHT: 66 IN | DIASTOLIC BLOOD PRESSURE: 90 MMHG | HEART RATE: 87 BPM

## 2025-09-03 DIAGNOSIS — Z23 NEED FOR PROPHYLACTIC VACCINATION AND INOCULATION AGAINST INFLUENZA: ICD-10-CM

## 2025-09-03 DIAGNOSIS — Z29.11 NEED FOR RSV IMMUNIZATION: ICD-10-CM

## 2025-09-03 DIAGNOSIS — O13.3 GESTATIONAL HYPERTENSION WITHOUT SIGNIFICANT PROTEINURIA DURING PREGNANCY IN THIRD TRIMESTER, ANTEPARTUM: Primary | ICD-10-CM

## 2025-09-03 DIAGNOSIS — Z34.93 THIRD TRIMESTER PREGNANCY: ICD-10-CM

## 2025-09-04 LAB — GP B STREP DNA SPEC QL NAA+PROBE: NEGATIVE

## (undated) DEVICE — SU MONOCRYL 4-0 PS-2 18" UND Y496G

## (undated) DEVICE — STRAP KNEE/BODY 31143004

## (undated) DEVICE — GLOVE ESTEEM POWDER FREE SMT 6.5  2D72PT65

## (undated) DEVICE — SOL WATER IRRIG 1000ML BOTTLE 07139-09

## (undated) DEVICE — ESU PENCIL W/HOLSTER

## (undated) DEVICE — CATH TRAY FOLEY 16FR BARDEX W/DRAIN BAG STATLOCK 300316A

## (undated) DEVICE — STOCKING SLEEVE COMPRESSION CALF MED

## (undated) DEVICE — PREP CHLORAPREP 26ML TINTED ORANGE  260815

## (undated) DEVICE — GLOVE PROTEXIS BLUE W/NEU-THERA 6.5  2D73EB65

## (undated) DEVICE — PACK C-SECTION LF PL15OTA83B

## (undated) DEVICE — SOL NACL 0.9% IRRIG 1000ML BOTTLE 07138-09

## (undated) DEVICE — DRSG ABDOMINAL 07 1/2X8" 7197D

## (undated) DEVICE — SU VICRYL 0 CT-1 36" J346H

## (undated) DEVICE — ESU GROUND PAD UNIVERSAL W/O CORD

## (undated) RX ORDER — ONDANSETRON 2 MG/ML
INJECTION INTRAMUSCULAR; INTRAVENOUS
Status: DISPENSED
Start: 2023-03-17

## (undated) RX ORDER — FENTANYL CITRATE 50 UG/ML
INJECTION, SOLUTION INTRAMUSCULAR; INTRAVENOUS
Status: DISPENSED
Start: 2023-03-16

## (undated) RX ORDER — KETOROLAC TROMETHAMINE 30 MG/ML
INJECTION, SOLUTION INTRAMUSCULAR; INTRAVENOUS
Status: DISPENSED
Start: 2023-03-17

## (undated) RX ORDER — FENTANYL CITRATE-0.9 % NACL/PF 10 MCG/ML
PLASTIC BAG, INJECTION (ML) INTRAVENOUS
Status: DISPENSED
Start: 2023-03-17

## (undated) RX ORDER — MORPHINE SULFATE 1 MG/ML
INJECTION, SOLUTION EPIDURAL; INTRATHECAL; INTRAVENOUS
Status: DISPENSED
Start: 2023-03-16